# Patient Record
Sex: MALE | Race: WHITE | NOT HISPANIC OR LATINO | Employment: FULL TIME | URBAN - NONMETROPOLITAN AREA
[De-identification: names, ages, dates, MRNs, and addresses within clinical notes are randomized per-mention and may not be internally consistent; named-entity substitution may affect disease eponyms.]

---

## 2023-02-27 LAB
ESTIMATED AVERAGE GLUCOSE FOR HBA1C: 203 MG/DL
HEMOGLOBIN A1C/HEMOGLOBIN TOTAL IN BLOOD: 8.7 %

## 2023-03-31 ENCOUNTER — TELEPHONE (OUTPATIENT)
Dept: PRIMARY CARE | Facility: CLINIC | Age: 68
End: 2023-03-31
Payer: COMMERCIAL

## 2023-03-31 NOTE — TELEPHONE ENCOUNTER
Patient called and says that he needs to go over his sugar prescriptions and is having issues getting some of his medications filled

## 2023-03-31 NOTE — TELEPHONE ENCOUNTER
Pt stated insurance will not cover glyburide but they will cover glimeperide, will not cover jardiance but will cover farxiga-  after starting jardiance  BG was 105, he did a protein diet last week bg 97-78

## 2023-04-03 DIAGNOSIS — E11.9 TYPE 2 DIABETES MELLITUS WITHOUT COMPLICATION, WITHOUT LONG-TERM CURRENT USE OF INSULIN (MULTI): Primary | ICD-10-CM

## 2023-04-03 RX ORDER — GLYBURIDE 5 MG/1
5 TABLET ORAL
COMMUNITY
End: 2023-04-03

## 2023-04-03 RX ORDER — EMPAGLIFLOZIN 10 MG/1
1 TABLET, FILM COATED ORAL DAILY
COMMUNITY
Start: 2023-02-27 | End: 2023-04-03

## 2023-04-03 RX ORDER — PIOGLITAZONEHYDROCHLORIDE 45 MG/1
1 TABLET ORAL DAILY
COMMUNITY
Start: 2023-02-26 | End: 2023-08-28 | Stop reason: SDUPTHER

## 2023-04-03 RX ORDER — GLIMEPIRIDE 2 MG/1
2 TABLET ORAL
Qty: 30 TABLET | Refills: 1 | Status: SHIPPED | OUTPATIENT
Start: 2023-04-03 | End: 2023-05-25 | Stop reason: SDUPTHER

## 2023-04-03 RX ORDER — DAPAGLIFLOZIN 5 MG/1
5 TABLET, FILM COATED ORAL DAILY
Qty: 30 TABLET | Refills: 1 | Status: SHIPPED | OUTPATIENT
Start: 2023-04-03 | End: 2023-05-25 | Stop reason: SDUPTHER

## 2023-04-03 RX ORDER — METFORMIN HYDROCHLORIDE 500 MG/1
1000 TABLET, EXTENDED RELEASE ORAL 2 TIMES DAILY
COMMUNITY
End: 2023-08-28 | Stop reason: SDUPTHER

## 2023-04-03 NOTE — PROGRESS NOTES
Patient called and endorsed blood sugar 70s-90s after starting Jardiance, but insurance will no longer cover Jardiance or glyburide. Will send Rx for glimeperide 2mg and farxiga 5mg daily. Will advised to call me in 1 month and let me know average blood sugar.

## 2023-05-25 ENCOUNTER — TELEPHONE (OUTPATIENT)
Dept: PRIMARY CARE | Facility: CLINIC | Age: 68
End: 2023-05-25
Payer: COMMERCIAL

## 2023-05-25 DIAGNOSIS — E11.9 TYPE 2 DIABETES MELLITUS WITHOUT COMPLICATION, WITHOUT LONG-TERM CURRENT USE OF INSULIN (MULTI): ICD-10-CM

## 2023-05-25 RX ORDER — DAPAGLIFLOZIN 10 MG/1
10 TABLET, FILM COATED ORAL DAILY
Qty: 90 TABLET | Refills: 1 | Status: SHIPPED | OUTPATIENT
Start: 2023-05-25 | End: 2023-08-28 | Stop reason: SDUPTHER

## 2023-05-25 RX ORDER — GLIMEPIRIDE 2 MG/1
2 TABLET ORAL
Qty: 90 TABLET | Refills: 1 | Status: SHIPPED | OUTPATIENT
Start: 2023-05-25 | End: 2023-08-28 | Stop reason: SDUPTHER

## 2023-05-25 NOTE — TELEPHONE ENCOUNTER
Pt requested refills on two of his DM medications- refills proposed- he voiced understanding to message

## 2023-05-25 NOTE — TELEPHONE ENCOUNTER
Stating last month his blood sugars were pretty good between 130-170, beginning of this month it was 160-260 now this week it has been around 160-180. Can call back with questions.

## 2023-07-01 ENCOUNTER — LAB (OUTPATIENT)
Dept: LAB | Facility: LAB | Age: 68
End: 2023-07-01
Payer: COMMERCIAL

## 2023-07-01 DIAGNOSIS — E11.9 TYPE 2 DIABETES MELLITUS WITHOUT COMPLICATION, WITHOUT LONG-TERM CURRENT USE OF INSULIN (MULTI): ICD-10-CM

## 2023-07-01 LAB
ESTIMATED AVERAGE GLUCOSE FOR HBA1C: 203 MG/DL
HEMOGLOBIN A1C/HEMOGLOBIN TOTAL IN BLOOD: 8.7 %

## 2023-07-01 PROCEDURE — 36415 COLL VENOUS BLD VENIPUNCTURE: CPT

## 2023-07-01 PROCEDURE — 83036 HEMOGLOBIN GLYCOSYLATED A1C: CPT

## 2023-07-03 ENCOUNTER — TELEPHONE (OUTPATIENT)
Dept: PRIMARY CARE | Facility: CLINIC | Age: 68
End: 2023-07-03
Payer: COMMERCIAL

## 2023-07-03 NOTE — TELEPHONE ENCOUNTER
----- Message from Antonio Reece PA-C sent at 7/3/2023  7:39 AM EDT -----  Please let him know his A1C is still at 8.7, same as last check. We will continue current medications for now and see if there is anything else we can adjust at follow up. Thanks!

## 2023-08-25 RX ORDER — LOSARTAN POTASSIUM 50 MG/1
1 TABLET ORAL DAILY
COMMUNITY
Start: 2020-01-13 | End: 2023-08-28 | Stop reason: SDUPTHER

## 2023-08-25 RX ORDER — ATENOLOL 50 MG/1
50 TABLET ORAL DAILY
COMMUNITY
End: 2023-08-28 | Stop reason: SDUPTHER

## 2023-08-25 RX ORDER — SIMVASTATIN 40 MG/1
1 TABLET, FILM COATED ORAL NIGHTLY
COMMUNITY
Start: 2021-09-20 | End: 2023-08-28 | Stop reason: SDUPTHER

## 2023-08-25 RX ORDER — GLYBURIDE 5 MG/1
10 TABLET ORAL
COMMUNITY
Start: 2021-09-20 | End: 2023-08-28

## 2023-08-25 RX ORDER — TRIAMTERENE AND HYDROCHLOROTHIAZIDE 75; 50 MG/1; MG/1
1 TABLET ORAL DAILY
COMMUNITY
End: 2023-08-28 | Stop reason: SDUPTHER

## 2023-08-28 ENCOUNTER — OFFICE VISIT (OUTPATIENT)
Dept: PRIMARY CARE | Facility: CLINIC | Age: 68
End: 2023-08-28
Payer: COMMERCIAL

## 2023-08-28 ENCOUNTER — APPOINTMENT (OUTPATIENT)
Dept: PRIMARY CARE | Facility: CLINIC | Age: 68
End: 2023-08-28
Payer: COMMERCIAL

## 2023-08-28 VITALS
WEIGHT: 315 LBS | BODY MASS INDEX: 45.1 KG/M2 | DIASTOLIC BLOOD PRESSURE: 78 MMHG | SYSTOLIC BLOOD PRESSURE: 128 MMHG | HEART RATE: 78 BPM | HEIGHT: 70 IN

## 2023-08-28 DIAGNOSIS — E11.9 TYPE 2 DIABETES MELLITUS WITHOUT COMPLICATION, WITHOUT LONG-TERM CURRENT USE OF INSULIN (MULTI): ICD-10-CM

## 2023-08-28 DIAGNOSIS — Z12.5 SCREENING FOR PROSTATE CANCER: ICD-10-CM

## 2023-08-28 DIAGNOSIS — I10 HYPERTENSION, UNSPECIFIED TYPE: ICD-10-CM

## 2023-08-28 DIAGNOSIS — E78.2 MIXED DYSLIPIDEMIA: ICD-10-CM

## 2023-08-28 DIAGNOSIS — Z00.00 ROUTINE GENERAL MEDICAL EXAMINATION AT HEALTH CARE FACILITY: Primary | ICD-10-CM

## 2023-08-28 PROCEDURE — 3052F HG A1C>EQUAL 8.0%<EQUAL 9.0%: CPT

## 2023-08-28 PROCEDURE — 4010F ACE/ARB THERAPY RXD/TAKEN: CPT

## 2023-08-28 PROCEDURE — 1036F TOBACCO NON-USER: CPT

## 2023-08-28 PROCEDURE — G0439 PPPS, SUBSEQ VISIT: HCPCS

## 2023-08-28 PROCEDURE — 3074F SYST BP LT 130 MM HG: CPT

## 2023-08-28 PROCEDURE — 1170F FXNL STATUS ASSESSED: CPT

## 2023-08-28 PROCEDURE — 3078F DIAST BP <80 MM HG: CPT

## 2023-08-28 PROCEDURE — 99214 OFFICE O/P EST MOD 30 MIN: CPT

## 2023-08-28 PROCEDURE — 1159F MED LIST DOCD IN RCRD: CPT

## 2023-08-28 RX ORDER — SIMVASTATIN 40 MG/1
40 TABLET, FILM COATED ORAL NIGHTLY
Qty: 90 TABLET | Refills: 3 | Status: SHIPPED | OUTPATIENT
Start: 2023-08-28 | End: 2024-06-03 | Stop reason: SDUPTHER

## 2023-08-28 RX ORDER — DAPAGLIFLOZIN 10 MG/1
10 TABLET, FILM COATED ORAL DAILY
Qty: 90 TABLET | Refills: 3 | Status: SHIPPED | OUTPATIENT
Start: 2023-08-28 | End: 2024-06-03 | Stop reason: SDUPTHER

## 2023-08-28 RX ORDER — TRIAMTERENE AND HYDROCHLOROTHIAZIDE 75; 50 MG/1; MG/1
1 TABLET ORAL DAILY
Qty: 90 TABLET | Refills: 3 | Status: SHIPPED | OUTPATIENT
Start: 2023-08-28 | End: 2024-06-03 | Stop reason: SDUPTHER

## 2023-08-28 RX ORDER — GLIMEPIRIDE 2 MG/1
2 TABLET ORAL
Qty: 90 TABLET | Refills: 3 | Status: SHIPPED | OUTPATIENT
Start: 2023-08-28 | End: 2023-12-04 | Stop reason: SDUPTHER

## 2023-08-28 RX ORDER — ATENOLOL 50 MG/1
50 TABLET ORAL DAILY
Qty: 90 TABLET | Refills: 3 | Status: SHIPPED | OUTPATIENT
Start: 2023-08-28 | End: 2024-06-03 | Stop reason: SDUPTHER

## 2023-08-28 RX ORDER — PIOGLITAZONEHYDROCHLORIDE 45 MG/1
45 TABLET ORAL DAILY
Qty: 90 TABLET | Refills: 3 | Status: SHIPPED | OUTPATIENT
Start: 2023-08-28 | End: 2024-06-03 | Stop reason: SDUPTHER

## 2023-08-28 RX ORDER — METFORMIN HYDROCHLORIDE 500 MG/1
1000 TABLET, EXTENDED RELEASE ORAL 2 TIMES DAILY
Qty: 360 TABLET | Refills: 3 | Status: SHIPPED | OUTPATIENT
Start: 2023-08-28 | End: 2024-06-03 | Stop reason: SDUPTHER

## 2023-08-28 RX ORDER — LOSARTAN POTASSIUM 50 MG/1
50 TABLET ORAL DAILY
Qty: 90 TABLET | Refills: 3 | Status: SHIPPED | OUTPATIENT
Start: 2023-08-28 | End: 2024-06-03 | Stop reason: SDUPTHER

## 2023-08-28 ASSESSMENT — ACTIVITIES OF DAILY LIVING (ADL)
DOING_HOUSEWORK: INDEPENDENT
MANAGING_FINANCES: INDEPENDENT
TAKING_MEDICATION: INDEPENDENT
BATHING: INDEPENDENT
DRESSING: INDEPENDENT
GROCERY_SHOPPING: INDEPENDENT

## 2023-08-28 ASSESSMENT — ENCOUNTER SYMPTOMS
CARDIOVASCULAR NEGATIVE: 1
CONSTITUTIONAL NEGATIVE: 1
GASTROINTESTINAL NEGATIVE: 1
RESPIRATORY NEGATIVE: 1

## 2023-08-28 ASSESSMENT — PATIENT HEALTH QUESTIONNAIRE - PHQ9
1. LITTLE INTEREST OR PLEASURE IN DOING THINGS: NOT AT ALL
SUM OF ALL RESPONSES TO PHQ9 QUESTIONS 1 AND 2: 0
2. FEELING DOWN, DEPRESSED OR HOPELESS: NOT AT ALL

## 2023-08-28 NOTE — PROGRESS NOTES
"Subjective   Reason for Visit: Rangel Tavarez is an 68 y.o. male here for a Medicare Wellness visit.     Past Medical, Surgical, and Family History reviewed and updated in chart.    Reviewed all medications by prescribing practitioner or clinical pharmacist (such as prescriptions, OTCs, herbal therapies and supplements) and documented in the medical record.    HPI  DM2: A1C 8.7 7/1/23. Blood sugar at home averaging 130s-160s. Eye exams yearly in [place]. Daily foot checks, denies neuropathy. Compliant with medications, no SE. He will have occasional jitters when sugar gets down into 70s, he keeps snacks close by in case this happens. He forgets to eat sometimes.  HTN: BP in office good 128/78. Does not check at home. \"Always good\". Denies CP/HA/dizziness/visual changes. Compliant w/medications, no SE.  HYPERLIPIDEMIA: Last lipids good with slightly elevated triglycerides. Compliant with statin, no SE.     PSA WNL 11/22/22.  Denies family Hx of colon cancer, denies colon cancer screening at this time.   Denies age appropriate vaccines at this time.     Drives truck, hauls sand.     Feeling well, no concerns.     Patient Care Team:  Antonio Reece PA-C as PCP - General  Robert Knapp MD as PCP - [facility] PCP     Review of Systems   Constitutional: Negative.    Respiratory: Negative.     Cardiovascular: Negative.    Gastrointestinal: Negative.        Objective   Vitals:  /78   Pulse 78   Ht 1.778 m (5' 10\")   Wt 147 kg (325 lb)   BMI 46.63 kg/m²       Physical Exam  Constitutional:       General: He is not in acute distress.     Appearance: Normal appearance. He is not ill-appearing or toxic-appearing.   HENT:      Head: Normocephalic and atraumatic.   Eyes:      Extraocular Movements: Extraocular movements intact.      Conjunctiva/sclera: Conjunctivae normal.   Cardiovascular:      Rate and Rhythm: Normal rate and regular rhythm.      Heart sounds: Normal heart sounds. No murmur " heard.     No gallop.   Pulmonary:      Effort: Pulmonary effort is normal.      Breath sounds: Normal breath sounds. No stridor. No wheezing.   Abdominal:      General: Bowel sounds are normal. There is no distension.      Palpations: Abdomen is soft.      Tenderness: There is no abdominal tenderness. There is no right CVA tenderness, left CVA tenderness, guarding or rebound.   Musculoskeletal:         General: Normal range of motion.      Cervical back: Neck supple.   Skin:     General: Skin is warm and dry.      Findings: No erythema or rash.   Neurological:      General: No focal deficit present.      Mental Status: He is alert and oriented to person, place, and time.   Psychiatric:         Mood and Affect: Mood normal.         Behavior: Behavior normal.         Thought Content: Thought content normal.         Judgment: Judgment normal.         Assessment/Plan   Problem List Items Addressed This Visit    None  Visit Diagnoses       Type 2 diabetes mellitus without complication, without long-term current use of insulin (CMS/[facility])        Hypertension, unspecified type        Mixed dyslipidemia                 No medication changes today.  Discussed avoiding low blood sugar by eating on a consistent schedule.  We will follow up in 3 months with fasting labs prior.

## 2023-08-28 NOTE — PROGRESS NOTES
"Subjective   Patient ID: Rangel Tavarez is a 68 y.o. male who presents for Medicare Annual Wellness Visit Initial (6 month med check ).    HPI   DM2: A1C 8.6 11/22/22. Blood sugar at home averaging mid-low 100s. Eye exams yearly with Dr. Yoo, [place]. Daily foot checks, denies neuropathy. Compliant with medications, no SE. He will have occasional jitters when sugar gets down into 70s, he keeps snacks close by in case this happens. He forgets to eat sometimes.  HTN: BP in office good 124/62. Does not check at home. \"Always good\". Denies CP/HA/dizziness/visual changes. Compliant w/medications, no SE.  HYPERLIPIDEMIA: Last lipids good with slightly elevated triglycerides. Compliant with statin, no SE.     PSA WNL 11/22/22.  Denies family Hx of colon cancer, denies colon cancer screening at this time.   Denies age appropriate vaccines at this time.     Drives truck, hauls sand.     Feeling well, no concerns.     Review of Systems    Objective   /78   Pulse 78   Ht 1.778 m (5' 10\")   Wt 147 kg (325 lb)   BMI 46.63 kg/m²     Physical Exam    Assessment/Plan          "

## 2023-11-25 ENCOUNTER — LAB (OUTPATIENT)
Dept: LAB | Facility: LAB | Age: 68
End: 2023-11-25
Payer: COMMERCIAL

## 2023-11-25 DIAGNOSIS — E78.2 MIXED DYSLIPIDEMIA: ICD-10-CM

## 2023-11-25 DIAGNOSIS — Z12.5 SCREENING FOR PROSTATE CANCER: ICD-10-CM

## 2023-11-25 DIAGNOSIS — E11.9 TYPE 2 DIABETES MELLITUS WITHOUT COMPLICATION, WITHOUT LONG-TERM CURRENT USE OF INSULIN (MULTI): ICD-10-CM

## 2023-11-25 LAB
ALBUMIN SERPL BCP-MCNC: 4.1 G/DL (ref 3.4–5)
ALP SERPL-CCNC: 54 U/L (ref 33–136)
ALT SERPL W P-5'-P-CCNC: 17 U/L (ref 10–52)
ANION GAP SERPL CALC-SCNC: 14 MMOL/L (ref 10–20)
AST SERPL W P-5'-P-CCNC: 15 U/L (ref 9–39)
BILIRUB SERPL-MCNC: 0.4 MG/DL (ref 0–1.2)
BUN SERPL-MCNC: 26 MG/DL (ref 6–23)
CALCIUM SERPL-MCNC: 9.2 MG/DL (ref 8.6–10.3)
CHLORIDE SERPL-SCNC: 102 MMOL/L (ref 98–107)
CHOLEST SERPL-MCNC: 192 MG/DL (ref 0–199)
CHOLESTEROL/HDL RATIO: 3.8
CO2 SERPL-SCNC: 24 MMOL/L (ref 21–32)
CREAT SERPL-MCNC: 1.22 MG/DL (ref 0.5–1.3)
CREAT UR-MCNC: <1 MG/DL (ref 20–370)
ERYTHROCYTE [DISTWIDTH] IN BLOOD BY AUTOMATED COUNT: 14.9 % (ref 11.5–14.5)
EST. AVERAGE GLUCOSE BLD GHB EST-MCNC: 206 MG/DL
GFR SERPL CREATININE-BSD FRML MDRD: 65 ML/MIN/1.73M*2
GLUCOSE SERPL-MCNC: 171 MG/DL (ref 74–99)
HBA1C MFR BLD: 8.8 %
HCT VFR BLD AUTO: 52.9 % (ref 41–52)
HDLC SERPL-MCNC: 50 MG/DL
HGB BLD-MCNC: 16.6 G/DL (ref 13.5–17.5)
LDLC SERPL CALC-MCNC: 115 MG/DL
MCH RBC QN AUTO: 27.9 PG (ref 26–34)
MCHC RBC AUTO-ENTMCNC: 31.4 G/DL (ref 32–36)
MCV RBC AUTO: 89 FL (ref 80–100)
MICROALBUMIN UR-MCNC: <7 MG/L
MICROALBUMIN/CREAT UR: ABNORMAL MG/G{CREAT}
NON HDL CHOLESTEROL: 142 MG/DL (ref 0–149)
NRBC BLD-RTO: 0 /100 WBCS (ref 0–0)
PLATELET # BLD AUTO: 166 X10*3/UL (ref 150–450)
POTASSIUM SERPL-SCNC: 4.1 MMOL/L (ref 3.5–5.3)
PROT SERPL-MCNC: 6.4 G/DL (ref 6.4–8.2)
PSA SERPL-MCNC: 1.8 NG/ML
RBC # BLD AUTO: 5.94 X10*6/UL (ref 4.5–5.9)
SODIUM SERPL-SCNC: 136 MMOL/L (ref 136–145)
TRIGL SERPL-MCNC: 133 MG/DL (ref 0–149)
VLDL: 27 MG/DL (ref 0–40)
WBC # BLD AUTO: 7.1 X10*3/UL (ref 4.4–11.3)

## 2023-11-25 PROCEDURE — 36415 COLL VENOUS BLD VENIPUNCTURE: CPT

## 2023-11-25 PROCEDURE — G0103 PSA SCREENING: HCPCS

## 2023-11-25 PROCEDURE — 83036 HEMOGLOBIN GLYCOSYLATED A1C: CPT

## 2023-11-25 PROCEDURE — 80053 COMPREHEN METABOLIC PANEL: CPT

## 2023-11-25 PROCEDURE — 80061 LIPID PANEL: CPT

## 2023-11-25 PROCEDURE — 85027 COMPLETE CBC AUTOMATED: CPT

## 2023-11-25 PROCEDURE — 82043 UR ALBUMIN QUANTITATIVE: CPT

## 2023-11-25 PROCEDURE — 82570 ASSAY OF URINE CREATININE: CPT

## 2023-12-04 ENCOUNTER — OFFICE VISIT (OUTPATIENT)
Dept: PRIMARY CARE | Facility: CLINIC | Age: 68
End: 2023-12-04
Payer: COMMERCIAL

## 2023-12-04 VITALS
BODY MASS INDEX: 44.1 KG/M2 | SYSTOLIC BLOOD PRESSURE: 124 MMHG | HEART RATE: 76 BPM | WEIGHT: 315 LBS | DIASTOLIC BLOOD PRESSURE: 62 MMHG | HEIGHT: 71 IN

## 2023-12-04 DIAGNOSIS — E11.9 TYPE 2 DIABETES MELLITUS WITHOUT COMPLICATION, WITHOUT LONG-TERM CURRENT USE OF INSULIN (MULTI): ICD-10-CM

## 2023-12-04 DIAGNOSIS — E78.2 MIXED DYSLIPIDEMIA: ICD-10-CM

## 2023-12-04 DIAGNOSIS — I10 HYPERTENSION, UNSPECIFIED TYPE: Primary | ICD-10-CM

## 2023-12-04 PROCEDURE — 3074F SYST BP LT 130 MM HG: CPT

## 2023-12-04 PROCEDURE — 3078F DIAST BP <80 MM HG: CPT

## 2023-12-04 PROCEDURE — 1159F MED LIST DOCD IN RCRD: CPT

## 2023-12-04 PROCEDURE — 3052F HG A1C>EQUAL 8.0%<EQUAL 9.0%: CPT

## 2023-12-04 PROCEDURE — 3049F LDL-C 100-129 MG/DL: CPT

## 2023-12-04 PROCEDURE — 4010F ACE/ARB THERAPY RXD/TAKEN: CPT

## 2023-12-04 PROCEDURE — 99213 OFFICE O/P EST LOW 20 MIN: CPT

## 2023-12-04 PROCEDURE — 3062F POS MACROALBUMINURIA REV: CPT

## 2023-12-04 PROCEDURE — 1036F TOBACCO NON-USER: CPT

## 2023-12-04 RX ORDER — GLIMEPIRIDE 4 MG/1
4 TABLET ORAL
Qty: 90 TABLET | Refills: 3 | Status: SHIPPED | OUTPATIENT
Start: 2023-12-04 | End: 2024-12-03

## 2023-12-04 RX ORDER — ASPIRIN/CAFFEINE 500-32.5MG
TABLET ORAL
COMMUNITY

## 2023-12-04 ASSESSMENT — ENCOUNTER SYMPTOMS
CONSTITUTIONAL NEGATIVE: 1
CARDIOVASCULAR NEGATIVE: 1
RESPIRATORY NEGATIVE: 1
GASTROINTESTINAL NEGATIVE: 1

## 2023-12-04 NOTE — PROGRESS NOTES
"Subjective   Patient ID: Rangel Tavarez is a 68 y.o. male who presents for 3 month f/u review labs (Pt c/o excessive thirst and hunger x2 months).    HPI   DM2: A1C 8.7 7/1/23. Blood sugar at home ranging averaging between 120-190s, he did have one low in the 60s. Eye exams yearly in [place]. Daily foot checks, denies neuropathy. Compliant with medications, no SE. He will have occasional jitters when sugar gets down into 70s, he keeps snacks close by in case this happens. Trying to eat consistently.  HTN: BP in office good 124/62. Does not check at home. \"Always good\". Denies CP/HA/dizziness/visual changes. Compliant w/medications, no SE.  HYPERLIPIDEMIA: Last lipids good with slightly LDL. Compliant with statin, no SE.    He does endorse some increased thirst recently, most likely due to elevated glucose levels.     PSA WNL 11/25/23.  Denies family Hx of colon cancer, denies colon cancer screening at this time.   Denies lung cancer screening at this time.  Denies age appropriate vaccines at this time.     Drives truck, hauls sand.    Review of Systems   Constitutional: Negative.    Respiratory: Negative.     Cardiovascular: Negative.    Gastrointestinal: Negative.        Objective   /62   Pulse 76   Ht 1.791 m (5' 10.5\")   Wt 148 kg (327 lb)   BMI 46.26 kg/m²     Physical Exam  Constitutional:       General: He is not in acute distress.     Appearance: Normal appearance. He is not ill-appearing.   HENT:      Head: Normocephalic and atraumatic.   Eyes:      Extraocular Movements: Extraocular movements intact.      Conjunctiva/sclera: Conjunctivae normal.   Cardiovascular:      Rate and Rhythm: Normal rate.   Pulmonary:      Effort: Pulmonary effort is normal.   Abdominal:      General: There is no distension.   Musculoskeletal:         General: Normal range of motion.      Cervical back: Normal range of motion.   Skin:     General: Skin is warm and dry.   Neurological:      General: No focal deficit " present.      Mental Status: He is alert and oriented to person, place, and time.   Psychiatric:         Mood and Affect: Mood normal.         Behavior: Behavior normal.         Thought Content: Thought content normal.         Judgment: Judgment normal.         Assessment/Plan        Discussed GLP1-agonists, he will think about this.  Increased glimepiride to 4mg daily, no other medication changes today.  Follow up 3 months with A1C prior.

## 2024-03-25 ENCOUNTER — APPOINTMENT (OUTPATIENT)
Dept: PRIMARY CARE | Facility: CLINIC | Age: 69
End: 2024-03-25
Payer: COMMERCIAL

## 2024-03-28 ENCOUNTER — APPOINTMENT (OUTPATIENT)
Dept: PRIMARY CARE | Facility: CLINIC | Age: 69
End: 2024-03-28
Payer: COMMERCIAL

## 2024-05-18 ENCOUNTER — LAB (OUTPATIENT)
Dept: LAB | Facility: LAB | Age: 69
End: 2024-05-18
Payer: COMMERCIAL

## 2024-05-18 DIAGNOSIS — E11.9 TYPE 2 DIABETES MELLITUS WITHOUT COMPLICATION, WITHOUT LONG-TERM CURRENT USE OF INSULIN (MULTI): ICD-10-CM

## 2024-05-18 LAB
EST. AVERAGE GLUCOSE BLD GHB EST-MCNC: 194 MG/DL
HBA1C MFR BLD: 8.4 %

## 2024-05-18 PROCEDURE — 83036 HEMOGLOBIN GLYCOSYLATED A1C: CPT

## 2024-05-18 PROCEDURE — 36415 COLL VENOUS BLD VENIPUNCTURE: CPT

## 2024-06-03 ENCOUNTER — OFFICE VISIT (OUTPATIENT)
Dept: PRIMARY CARE | Facility: CLINIC | Age: 69
End: 2024-06-03
Payer: COMMERCIAL

## 2024-06-03 VITALS
DIASTOLIC BLOOD PRESSURE: 80 MMHG | HEIGHT: 71 IN | BODY MASS INDEX: 44.1 KG/M2 | WEIGHT: 315 LBS | HEART RATE: 91 BPM | OXYGEN SATURATION: 95 % | SYSTOLIC BLOOD PRESSURE: 120 MMHG

## 2024-06-03 DIAGNOSIS — E78.2 MIXED DYSLIPIDEMIA: ICD-10-CM

## 2024-06-03 DIAGNOSIS — E11.9 TYPE 2 DIABETES MELLITUS WITHOUT COMPLICATION, WITHOUT LONG-TERM CURRENT USE OF INSULIN (MULTI): ICD-10-CM

## 2024-06-03 DIAGNOSIS — I10 HYPERTENSION, UNSPECIFIED TYPE: ICD-10-CM

## 2024-06-03 DIAGNOSIS — Z12.5 SCREENING FOR PROSTATE CANCER: ICD-10-CM

## 2024-06-03 PROCEDURE — 3079F DIAST BP 80-89 MM HG: CPT

## 2024-06-03 PROCEDURE — 3074F SYST BP LT 130 MM HG: CPT

## 2024-06-03 PROCEDURE — 4010F ACE/ARB THERAPY RXD/TAKEN: CPT

## 2024-06-03 PROCEDURE — 3052F HG A1C>EQUAL 8.0%<EQUAL 9.0%: CPT

## 2024-06-03 PROCEDURE — 1036F TOBACCO NON-USER: CPT

## 2024-06-03 PROCEDURE — 99214 OFFICE O/P EST MOD 30 MIN: CPT

## 2024-06-03 PROCEDURE — 3008F BODY MASS INDEX DOCD: CPT

## 2024-06-03 PROCEDURE — 1159F MED LIST DOCD IN RCRD: CPT

## 2024-06-03 RX ORDER — PIOGLITAZONEHYDROCHLORIDE 45 MG/1
45 TABLET ORAL DAILY
Qty: 90 TABLET | Refills: 3 | Status: SHIPPED | OUTPATIENT
Start: 2024-06-03 | End: 2025-06-03

## 2024-06-03 RX ORDER — LOSARTAN POTASSIUM 50 MG/1
50 TABLET ORAL DAILY
Qty: 90 TABLET | Refills: 3 | Status: SHIPPED | OUTPATIENT
Start: 2024-06-03 | End: 2025-06-03

## 2024-06-03 RX ORDER — ATENOLOL 50 MG/1
50 TABLET ORAL DAILY
Qty: 90 TABLET | Refills: 3 | Status: SHIPPED | OUTPATIENT
Start: 2024-06-03 | End: 2025-06-03

## 2024-06-03 RX ORDER — TRIAMTERENE AND HYDROCHLOROTHIAZIDE 75; 50 MG/1; MG/1
1 TABLET ORAL DAILY
Qty: 90 TABLET | Refills: 3 | Status: SHIPPED | OUTPATIENT
Start: 2024-06-03 | End: 2025-06-03

## 2024-06-03 RX ORDER — SIMVASTATIN 40 MG/1
40 TABLET, FILM COATED ORAL NIGHTLY
Qty: 90 TABLET | Refills: 3 | Status: SHIPPED | OUTPATIENT
Start: 2024-06-03 | End: 2025-06-03

## 2024-06-03 RX ORDER — METFORMIN HYDROCHLORIDE 500 MG/1
1000 TABLET, EXTENDED RELEASE ORAL 2 TIMES DAILY
Qty: 360 TABLET | Refills: 3 | Status: SHIPPED | OUTPATIENT
Start: 2024-06-03 | End: 2025-06-03

## 2024-06-03 ASSESSMENT — ENCOUNTER SYMPTOMS
GASTROINTESTINAL NEGATIVE: 1
RESPIRATORY NEGATIVE: 1
CONSTITUTIONAL NEGATIVE: 1
CARDIOVASCULAR NEGATIVE: 1

## 2024-06-03 ASSESSMENT — PATIENT HEALTH QUESTIONNAIRE - PHQ9
2. FEELING DOWN, DEPRESSED OR HOPELESS: NOT AT ALL
SUM OF ALL RESPONSES TO PHQ9 QUESTIONS 1 AND 2: 0
1. LITTLE INTEREST OR PLEASURE IN DOING THINGS: NOT AT ALL

## 2024-06-03 NOTE — PROGRESS NOTES
"Subjective   Patient ID: Rangel Tavarez is a 68 y.o. male who presents for pt here for med check .    HPI   DM2: A1C 8.4 last check. Not checking sugar at home. Eye exams yearly in [place]. Daily foot checks, denies neuropathy. Compliant with medications, no SE. No lows.   HTN: BP in office good 120/80. Does not check at home. \"Always good\". Denies CP/HA/dizziness/visual changes. Was tachy at beginning of visit today, rechecked at end of visit back into 90s. Compliant w/medications, no SE.  HYPERLIPIDEMIA: Last lipids good with slightly LDL. Compliant with statin, no SE.     PSA WNL 11/25/23.  Denies family Hx of colon cancer, denies colon cancer screening at this time.   Denies lung cancer screening at this time.  Denies age appropriate vaccines at this time.     Drives truck, hauls sand.    Review of Systems   Constitutional: Negative.    Respiratory: Negative.     Cardiovascular: Negative.    Gastrointestinal: Negative.        Objective   /80   Pulse (!) 112   Ht 1.791 m (5' 10.5\")   Wt 148 kg (327 lb)   SpO2 95%   BMI 46.26 kg/m²     Physical Exam  Constitutional:       General: He is not in acute distress.     Appearance: Normal appearance. He is not ill-appearing.   HENT:      Head: Normocephalic and atraumatic.   Eyes:      Extraocular Movements: Extraocular movements intact.      Conjunctiva/sclera: Conjunctivae normal.   Cardiovascular:      Rate and Rhythm: Normal rate and regular rhythm.      Heart sounds: Normal heart sounds. No murmur heard.  Pulmonary:      Effort: Pulmonary effort is normal.      Breath sounds: Normal breath sounds. No wheezing.   Abdominal:      General: There is no distension.   Musculoskeletal:         General: Normal range of motion.      Cervical back: Normal range of motion.   Skin:     General: Skin is warm and dry.   Neurological:      General: No focal deficit present.      Mental Status: He is alert and oriented to person, place, and time.   Psychiatric:         " Mood and Affect: Mood normal.         Behavior: Behavior normal.         Thought Content: Thought content normal.         Judgment: Judgment normal.         Assessment/Plan        Advised monitor HR for consistent increases and let me know.  Insurance seems to want to pay for Jardiance now, so switched from Farxiga to Jardiance and increased to 25mg daily.  No other medication changes today.  Follow up 6 months with fasting labs prior.

## 2024-06-19 ENCOUNTER — TELEPHONE (OUTPATIENT)
Dept: PRIMARY CARE | Facility: CLINIC | Age: 69
End: 2024-06-19
Payer: COMMERCIAL

## 2024-06-20 DIAGNOSIS — I10 HYPERTENSION, UNSPECIFIED TYPE: ICD-10-CM

## 2024-06-24 ENCOUNTER — TELEPHONE (OUTPATIENT)
Dept: PRIMARY CARE | Facility: CLINIC | Age: 69
End: 2024-06-24
Payer: COMMERCIAL

## 2024-06-24 NOTE — TELEPHONE ENCOUNTER
HE CALLED TO SAY HE SHOULD BE IN THE TRUCK FOR THE REST OF THE DAY, AND TO CALL HIM BACK. THANK YOU.

## 2024-06-25 ENCOUNTER — TELEPHONE (OUTPATIENT)
Dept: PRIMARY CARE | Facility: CLINIC | Age: 69
End: 2024-06-25
Payer: COMMERCIAL

## 2024-06-25 DIAGNOSIS — I10 HYPERTENSION, UNSPECIFIED TYPE: ICD-10-CM

## 2024-06-25 DIAGNOSIS — R00.0 TACHYCARDIA: Primary | ICD-10-CM

## 2024-06-25 NOTE — TELEPHONE ENCOUNTER
Per call from patient, he will be unable to  his monitor at the hospital on Wednesday, he will be back in town on Friday, please call back to discuss

## 2024-06-26 ENCOUNTER — APPOINTMENT (OUTPATIENT)
Dept: CARDIOLOGY | Facility: HOSPITAL | Age: 69
End: 2024-06-26
Payer: COMMERCIAL

## 2024-06-28 ENCOUNTER — TELEPHONE (OUTPATIENT)
Dept: PRIMARY CARE | Facility: CLINIC | Age: 69
End: 2024-06-28
Payer: COMMERCIAL

## 2024-06-28 RX ORDER — ATENOLOL 50 MG/1
100 TABLET ORAL DAILY
Qty: 180 TABLET | Refills: 0 | Status: SHIPPED | OUTPATIENT
Start: 2024-06-28 | End: 2025-06-28

## 2024-06-28 NOTE — TELEPHONE ENCOUNTER
atenolol (Tenormin) 50 mg tablet [[id number]]    Order Details  Dose: 50 mg Route: oral Frequency: Daily   Dispense Quantity: 90 tablet Refills: 3          Sig: Take 1 tablet (50 mg) by mouth once daily.   HE SAID HE HAS BEEN DOUBLE UP ON THIS PILL AND IS ALMOST OUT, HAS 9 PILLS LEFT. . BP HAS BEEN 130/80, 109/72, PULSE LOW TO  ,O2 LOW MID 90S. THANK YOU.

## 2024-06-28 NOTE — TELEPHONE ENCOUNTER
HE CALLED TO SAY HE IS PICKING UP THAT MONITOR NEXT FRIDAY AT 8:00. THANK YOU.   DOES HE NEED A EKG? PLEASE CALL HIM BACK.

## 2024-07-05 ENCOUNTER — HOSPITAL ENCOUNTER (OUTPATIENT)
Dept: CARDIOLOGY | Facility: HOSPITAL | Age: 69
Discharge: HOME | End: 2024-07-05
Payer: COMMERCIAL

## 2024-07-05 DIAGNOSIS — R00.0 TACHYCARDIA: ICD-10-CM

## 2024-07-05 PROCEDURE — 93242 EXT ECG>48HR<7D RECORDING: CPT

## 2024-07-12 ENCOUNTER — TELEPHONE (OUTPATIENT)
Dept: PRIMARY CARE | Facility: CLINIC | Age: 69
End: 2024-07-12
Payer: COMMERCIAL

## 2024-07-12 NOTE — TELEPHONE ENCOUNTER
I spoke to pt he voiced understanding- his symptoms are stable , he was instructed to go to ER if symptoms worsen or change

## 2024-07-18 ENCOUNTER — TELEPHONE (OUTPATIENT)
Dept: PRIMARY CARE | Facility: CLINIC | Age: 69
End: 2024-07-18
Payer: COMMERCIAL

## 2024-07-19 ENCOUNTER — OFFICE VISIT (OUTPATIENT)
Dept: CARDIOLOGY | Facility: CLINIC | Age: 69
End: 2024-07-19
Payer: COMMERCIAL

## 2024-07-19 ENCOUNTER — TELEPHONE (OUTPATIENT)
Dept: CARDIOLOGY | Facility: CLINIC | Age: 69
End: 2024-07-19

## 2024-07-19 ENCOUNTER — LAB (OUTPATIENT)
Dept: LAB | Facility: LAB | Age: 69
End: 2024-07-19
Payer: COMMERCIAL

## 2024-07-19 VITALS
WEIGHT: 315 LBS | HEIGHT: 71 IN | DIASTOLIC BLOOD PRESSURE: 82 MMHG | HEART RATE: 131 BPM | OXYGEN SATURATION: 95 % | BODY MASS INDEX: 44.1 KG/M2 | SYSTOLIC BLOOD PRESSURE: 120 MMHG

## 2024-07-19 DIAGNOSIS — I48.0 PAROXYSMAL ATRIAL FIBRILLATION (MULTI): Primary | ICD-10-CM

## 2024-07-19 DIAGNOSIS — G47.30 SLEEP APNEA TREATED WITH CONTINUOUS POSITIVE AIRWAY PRESSURE (CPAP): ICD-10-CM

## 2024-07-19 DIAGNOSIS — I48.0 PAROXYSMAL ATRIAL FIBRILLATION (MULTI): ICD-10-CM

## 2024-07-19 DIAGNOSIS — I10 PRIMARY HYPERTENSION: ICD-10-CM

## 2024-07-19 DIAGNOSIS — R06.09 DYSPNEA ON EXERTION: ICD-10-CM

## 2024-07-19 LAB — TSH SERPL-ACNC: 1.83 MIU/L (ref 0.44–3.98)

## 2024-07-19 PROCEDURE — 4010F ACE/ARB THERAPY RXD/TAKEN: CPT

## 2024-07-19 PROCEDURE — 93000 ELECTROCARDIOGRAM COMPLETE: CPT

## 2024-07-19 PROCEDURE — 3074F SYST BP LT 130 MM HG: CPT

## 2024-07-19 PROCEDURE — 1159F MED LIST DOCD IN RCRD: CPT

## 2024-07-19 PROCEDURE — 99204 OFFICE O/P NEW MOD 45 MIN: CPT

## 2024-07-19 PROCEDURE — 3008F BODY MASS INDEX DOCD: CPT

## 2024-07-19 PROCEDURE — 84443 ASSAY THYROID STIM HORMONE: CPT

## 2024-07-19 PROCEDURE — 3079F DIAST BP 80-89 MM HG: CPT

## 2024-07-19 PROCEDURE — 36415 COLL VENOUS BLD VENIPUNCTURE: CPT

## 2024-07-19 PROCEDURE — 1036F TOBACCO NON-USER: CPT

## 2024-07-19 PROCEDURE — 3052F HG A1C>EQUAL 8.0%<EQUAL 9.0%: CPT

## 2024-07-19 RX ORDER — AMINOPHYLLINE 25 MG/ML
125 INJECTION, SOLUTION INTRAVENOUS ONCE AS NEEDED
OUTPATIENT
Start: 2024-07-19

## 2024-07-19 RX ORDER — METOPROLOL TARTRATE 50 MG/1
50 TABLET ORAL 2 TIMES DAILY
Qty: 60 TABLET | Refills: 11 | Status: SHIPPED | OUTPATIENT
Start: 2024-07-19 | End: 2025-07-19

## 2024-07-19 RX ORDER — REGADENOSON 0.08 MG/ML
0.4 INJECTION, SOLUTION INTRAVENOUS
OUTPATIENT
Start: 2024-07-19

## 2024-07-19 NOTE — PROGRESS NOTES
CHIEF COMPLAINT  Referred by PCP, Antonio Reece PA-C for Afib on holter monitor    HISTORY OF PRESENT ILLNESS    Patient reported to PCP with complaints of tachycardia to which a 3-day monitor was ordered showing predominant rhythm of afib with 99% of the time HR spent in RVR.     Risk factors:  Pulmonary-none  Ischemia-unknown  Infection-no known recent infections   Rheumatic heart disease-unknown  Alcohol-1-2 drinks per week  Thyroid-last result in 2020 which were normal   Left atrial enlargement-unknown  Sleep apnea-compliant with CPAP  Obesity-BMI is 46    Currently, patient denies any chest pain/pressure/discomfort, worsening shortness of breath, palpitations, or dizziness. He does report bilateral lower leg edema but attributes it to his profession as a .     Risk factors:  Pulmonary-No   Ischemia-unknown  Infection-No   Rheumatic heart disease-unknown  Alcohol-  Thyroid-last result in 2020   Left atrial enlargement-unknown  Sleep apnea-compliant with CPAP; not currently managed by any provider   Obesity-BMI is 46    [id number] Score of 3    Contributory family hx:  Paternal grandfather-heart attack  Father-heart attack     Cardiovascular testing:  3-day monitor (June, 2024)-showing predominant rhythm Afib/flutter with average rate of 130 bpm; 100% burden of afib/flutter with >99% of time in RVR        Past Medical, Surgical, and Family History reviewed and updated in chart.     Reviewed all medications by prescribing practitioner or clinical pharmacist (such as prescriptions, OTCs, herbal therapies and supplements) and documented in the medical record.    Past Medical History  History reviewed. No pertinent past medical history.    Social History  Social History     Tobacco Use    Smoking status: Former     Types: Cigarettes    Smokeless tobacco: Never   Vaping Use    Vaping status: Never Used   Substance Use Topics    Alcohol use: Yes    Drug use: Never       Family History     Family  "History   Problem Relation Name Age of Onset    Heart attack Father      Diabetes Paternal Grandfather      Heart attack Paternal Grandfather      Cancer Other uncle         Allergies:  No Known Allergies     Outpatient Medications:  Current Outpatient Medications   Medication Instructions    apixaban (ELIQUIS) 5 mg, oral, 2 times daily    aspirin-caffeine (Georgi [employer]) 500-32.5 mg tablet oral    empagliflozin (JARDIANCE) 25 mg, oral, Daily    glimepiride (AMARYL) 4 mg, oral, Daily before breakfast    losartan (COZAAR) 50 mg, oral, Daily    metFORMIN XR (GLUCOPHAGE-XR) 1,000 mg, oral, 2 times daily    metoprolol tartrate (LOPRESSOR) 50 mg, oral, 2 times daily    pioglitazone (ACTOS) 45 mg, oral, Daily    simvastatin (ZOCOR) 40 mg, oral, Nightly    triamterene-hydrochlorothiazid (Maxzide) 75-50 mg tablet 1 tablet, oral, Daily          Labs:   CMP:  Recent Labs     11/25/23  0810 11/22/22  0650 07/23/22  1029 02/12/22  1025 04/24/21  0730    136 138 136 138   K 4.1 4.1 4.1 4.0 3.9    102 104 103 107   CO2 24 27 25 27 23   ANIONGAP 14 11 13 10 12   BUN 26* 29* 31* 21 24*   CREATININE 1.22 1.24 1.38* 1.15 1.16   EGFR 65  --   --   --   --      Recent Labs     11/25/23  0810 11/22/22  0650 02/12/22  1025 10/24/20  0756   ALBUMIN 4.1 4.1 4.1 4.2   ALKPHOS 54 55 48 55   ALT 17 19 24 19   AST 15 13 19 13   BILITOT 0.4 0.4 0.5 0.4     CBC:  Recent Labs     11/25/23  0810 10/24/20  0756 01/06/20  0858   WBC 7.1 6.7 7.5   HGB 16.6 16.3 15.8   HCT 52.9* 49.3 46.7    187 162   MCV 89 85 84     COAG: No results for input(s): \"PTT\", \"INR\", \"HAUF\", \"DDIMERVTE\", \"HAPTOGLOBIN\", \"FIBRINOGEN\" in the last 76943 hours.  ABO: No results for input(s): \"ABO\" in the last 56450 hours.  HEME/ENDO:  Recent Labs     05/18/24  0836 11/25/23  0810 07/01/23  0818 02/27/23  0834 01/25/21  1410 10/24/20  0756   TSH  --   --   --   --   --  2.40   HGBA1C 8.4* 8.8* 8.7* 8.7*   < > 8.2    < > = values in this interval not " "displayed.      CARDIAC: No results for input(s): \"LDH\", \"CKMB\", \"TROPHS\", \"BNP\" in the last 56028 hours.    No lab exists for component: \"CK\", \"CKMBP\"  Recent Labs     11/25/23  0810 11/22/22  0650 02/12/22  1025 04/24/21  0730   CHOL 192 175 173 148   LDLF  --  96 111* 90   HDL 50.0 47.0 43.0 35.0*   TRIG 133 161* 96 113     MICRO: No results for input(s): \"ESR\", \"CRP\", \"PROCAL\" in the last 72813 hours.  No results found for the last 90 days.    Notable Studies: imaging personally reviewed   EKG:No results found for this or any previous visit (from the past 4464 hour(s)).  Echocardiogram: No results found for this or any previous visit from the past 1825 days.    Stress Testing: No results found for this or any previous visit from the past 1825 days.    Cardiac Catheterization: No results found for this or any previous visit from the past 1825 days.  No results found for this or any previous visit from the past 3650 days.         REVIEW OF SYSTEMS  A 10-point system review was completed and was negative except as noted in the HPI.      VITALS  Vitals:    07/19/24 1021   BP: 120/82   Pulse: (!) 131   SpO2: 95%       PHYSICAL EXAM  General: awake, alert and oriented. No acute distress.   Skin: Skin is warm, dry and intact without rashes or lesions.  HEENT: normocephalic, atraumatic; conjunctivae are clear without exudates or hemorrhage. Sclera is non-icteric. Eyelids are normal in appearance without swelling or lesions. Hearing intact. Nares are patent bilaterally. Moist mucous membranes.   Cardiovascular: heart rate is fast; rhythm is sinus   Respiratory: bilateral lung sounds clear to auscultations without rales, rhonchi, or wheezes. No accessory muscle use or stridor  Gastrointestinal: obese  Genitourinary: exam deferred  Musculoskeletal: ROM intact, no deformities  Extremities: pulses palpable bilaterally;+1 to BLLE  Neurological: no focal deficits; gait steady  Psychiatric: appropriate mood and affect; good " judgment and insight          ASSESSMENT AND PLAN  Assessment/Plan   Diagnoses and all orders for this visit:  Paroxysmal atrial fibrillation (Multi)  Dyspnea on exertion  Sleep apnea treated with continuous positive airway pressure (CPAP)  -Will order nuclear stress test to rule out ischemia; patient requests Lexiscan due to bad knees. Patient educated if stress test is abnormal, a LHC will be recommended; patient verbalized understanding   -Will order echo to rule out any underlying structural abnormalities   -Will check TSH   -Will stop atenolol and start Lopressor 50mg twice daily  -[id number] Score of 3; will start Eliquis 5mg twice daily; patient educated on common adverse effects of medication and red flag signs to report; Eliquis samples provided.   -Will place referral to clinical pharmacist for cost of DOAC  -Will discuss further rhythm control options at follow up once diagnostic testing reviewed and discussed      RTC: after testing       Thank you for allowing me to participate in the care of this patient. Please reach me out if you have any questions or if you need any clarifications regarding the patient's care.    Neetu Hernandez, DNP, APRN, FNP-C  Division of Cardiovascular Medicine  [facility]  [facility]

## 2024-07-19 NOTE — TELEPHONE ENCOUNTER
----- Message from Vijaya SHAFER sent at 7/19/2024  3:26 PM EDT -----  Pt has been notified.  ----- Message -----  From: KAYLA Sumner-CNP, DNP  Sent: 7/19/2024   3:05 PM EDT  To: Do [email or web address] Card1 Clinical Support Staff    Please let Rangel know his thyroid function test was normal

## 2024-07-25 ENCOUNTER — TELEPHONE (OUTPATIENT)
Dept: PRIMARY CARE | Facility: CLINIC | Age: 69
End: 2024-07-25
Payer: COMMERCIAL

## 2024-07-25 NOTE — TELEPHONE ENCOUNTER
I spoke to pt he does want us to send in yearly rx for the cpap supplies- I contacted [facility] - they sent us a fax to be completed and sent back with face to face notes

## 2024-07-26 ENCOUNTER — TELEPHONE (OUTPATIENT)
Dept: PRIMARY CARE | Facility: CLINIC | Age: 69
End: 2024-07-26
Payer: COMMERCIAL

## 2024-07-26 NOTE — TELEPHONE ENCOUNTER
I spoke to pt regarding his cpap supplies- pt will fax download of compliance to us, we will send in form to [employer] for supplies

## 2024-07-29 ENCOUNTER — APPOINTMENT (OUTPATIENT)
Dept: RADIOLOGY | Facility: HOSPITAL | Age: 69
End: 2024-07-29
Payer: COMMERCIAL

## 2024-07-29 ENCOUNTER — APPOINTMENT (OUTPATIENT)
Dept: CARDIOLOGY | Facility: HOSPITAL | Age: 69
End: 2024-07-29
Payer: COMMERCIAL

## 2024-07-29 DIAGNOSIS — G47.33 OSA (OBSTRUCTIVE SLEEP APNEA): Primary | ICD-10-CM

## 2024-07-30 ENCOUNTER — APPOINTMENT (OUTPATIENT)
Dept: RADIOLOGY | Facility: HOSPITAL | Age: 69
End: 2024-07-30
Payer: COMMERCIAL

## 2024-07-31 ENCOUNTER — TELEPHONE (OUTPATIENT)
Dept: PRIMARY CARE | Facility: CLINIC | Age: 69
End: 2024-07-31
Payer: COMMERCIAL

## 2024-08-01 NOTE — TELEPHONE ENCOUNTER
Pt would like to hold off on the sleep study for now- -he will let us know when it is needed- his cpap machine is working fine - I called Marisol to cancel pt apt and order

## 2024-08-05 ENCOUNTER — HOSPITAL ENCOUNTER (OUTPATIENT)
Dept: RADIOLOGY | Facility: HOSPITAL | Age: 69
Discharge: HOME | End: 2024-08-05
Payer: COMMERCIAL

## 2024-08-05 ENCOUNTER — APPOINTMENT (OUTPATIENT)
Dept: SLEEP MEDICINE | Facility: HOSPITAL | Age: 69
End: 2024-08-05
Payer: COMMERCIAL

## 2024-08-05 ENCOUNTER — HOSPITAL ENCOUNTER (OUTPATIENT)
Dept: CARDIOLOGY | Facility: HOSPITAL | Age: 69
Discharge: HOME | End: 2024-08-05
Payer: COMMERCIAL

## 2024-08-05 DIAGNOSIS — I48.0 PAROXYSMAL ATRIAL FIBRILLATION (MULTI): ICD-10-CM

## 2024-08-05 DIAGNOSIS — R06.09 DYSPNEA ON EXERTION: ICD-10-CM

## 2024-08-05 PROCEDURE — 78452 HT MUSCLE IMAGE SPECT MULT: CPT

## 2024-08-05 PROCEDURE — 93016 CV STRESS TEST SUPVJ ONLY: CPT | Performed by: STUDENT IN AN ORGANIZED HEALTH CARE EDUCATION/TRAINING PROGRAM

## 2024-08-05 PROCEDURE — 2500000004 HC RX 250 GENERAL PHARMACY W/ HCPCS (ALT 636 FOR OP/ED)

## 2024-08-05 PROCEDURE — 93017 CV STRESS TEST TRACING ONLY: CPT

## 2024-08-05 PROCEDURE — 93018 CV STRESS TEST I&R ONLY: CPT | Performed by: STUDENT IN AN ORGANIZED HEALTH CARE EDUCATION/TRAINING PROGRAM

## 2024-08-05 PROCEDURE — 3430000001 HC RX 343 DIAGNOSTIC RADIOPHARMACEUTICALS

## 2024-08-05 PROCEDURE — A9502 TC99M TETROFOSMIN: HCPCS

## 2024-08-05 RX ORDER — REGADENOSON 0.08 MG/ML
0.4 INJECTION, SOLUTION INTRAVENOUS
Status: COMPLETED | OUTPATIENT
Start: 2024-08-05 | End: 2024-08-05

## 2024-08-06 ENCOUNTER — TELEPHONE (OUTPATIENT)
Dept: PRIMARY CARE | Facility: CLINIC | Age: 69
End: 2024-08-06
Payer: COMMERCIAL

## 2024-08-06 ENCOUNTER — TELEPHONE (OUTPATIENT)
Dept: CARDIOLOGY | Facility: CLINIC | Age: 69
End: 2024-08-06
Payer: COMMERCIAL

## 2024-08-06 ENCOUNTER — HOSPITAL ENCOUNTER (OUTPATIENT)
Dept: RADIOLOGY | Facility: HOSPITAL | Age: 69
Discharge: HOME | End: 2024-08-06
Payer: COMMERCIAL

## 2024-08-06 DIAGNOSIS — I10 HYPERTENSION, UNSPECIFIED TYPE: ICD-10-CM

## 2024-08-06 DIAGNOSIS — I48.0 PAROXYSMAL ATRIAL FIBRILLATION (MULTI): ICD-10-CM

## 2024-08-06 DIAGNOSIS — I48.3 TYPICAL ATRIAL FLUTTER (MULTI): Primary | ICD-10-CM

## 2024-08-06 PROCEDURE — 3430000001 HC RX 343 DIAGNOSTIC RADIOPHARMACEUTICALS

## 2024-08-06 PROCEDURE — A9502 TC99M TETROFOSMIN: HCPCS

## 2024-08-06 RX ORDER — METOPROLOL TARTRATE 100 MG/1
100 TABLET ORAL 2 TIMES DAILY
Qty: 60 TABLET | Refills: 11 | Status: SHIPPED | OUTPATIENT
Start: 2024-08-06 | End: 2025-08-06

## 2024-08-06 NOTE — TELEPHONE ENCOUNTER
Antonio are you able to reach out to Neetu David regarding Rangel? They are both very frustrated that test get scheduled then cancelled- their number one concern is that he may have a blood clot and doesn't know it-  they are getting contacted from Yayo Biggs - feeling like no one knows what's going on- Apt is now pushed out to Aug 22 and that one hasn't been confirmed yet. It would be helpful if someone could give them concrete answers and a plan of care and treatment -Thank you!

## 2024-08-06 NOTE — TELEPHONE ENCOUNTER
Patient wife would like a call back as they are getting a lot of run around in regards to his blood thinner with the cardiologist.

## 2024-08-06 NOTE — TELEPHONE ENCOUNTER
"----- Message from Saturnino CHOI sent at 8/6/2024  2:05 PM EDT -----  Pt notified.  ----- Message -----  From: Sarah E Markley, LPN  Sent: 8/6/2024   1:58 PM EDT  To: GEORGE Sumner DNP; #      ----- Message -----  From: GEORGE Sumner DNP  Sent: 8/6/2024   1:50 PM EDT  To: Sarah E Markley, LPN    Have him double up on the metoprolol so 100mg twice daily. I will send in a new prescription  ----- Message -----  From: Sarah E Markley, LPN  Sent: 8/6/2024   1:43 PM EDT  To: GEORGE Sumner DNP; #    Pt called and reports does monitor HR at home and has been approximately 131-135 since beginning of June.  ----- Message -----  From: GEORGE Sumner DNP  Sent: 8/6/2024  11:51 AM EDT  To: Sarah E Markley, LPN    Can patient monitor his resting heart rate in any way? I'm wondering if his symptoms are because his heart rate is high.    His 3 days monitor showed the abnormal heart rhythm with heart rate averaging between 101-145 bpm which is why he was started on different medication to help control his heart rates.  ----- Message -----  From: Sarah E Markley, LPN  Sent: 8/6/2024  10:56 AM EDT  To: Nevaeh Naik CMA; #    Pt called office back and notified of below messages. Pt would like to move forward with ANGELO and cardioversion on 8/22/24 with Dr. Worrell and appreciative. Pt advised to continue blood thinners as directed. Pt started Lopressor 50mg BID and Eliquis 5mg BID on 7/19/24 and reports approximately 1.5 hours after taking becomes extremely hot and lasts for about half hour then subsides. Pt reports mowed lawn yesterday and drank a beer and felt like \"head stopped up and thumps\".  Would you like to make any changes? Pt is also inquiring if there is any further etiology information about elevated heart rate noted on Holter monitor 2 months ago. Pt still needs to reschedule office follow up with Neetu and awaiting procedure confirmation.  ----- Message -----  From: Neetu Hernandez, " APRN-CNP, DNP  Sent: 8/6/2024  10:07 AM EDT  To: Sarah E Markley, LPN    Please let Rangel know the nuclear portion and EKG portion of his stress test show no evidence of ischemia; just the irregular rhythm on the EKG strip.

## 2024-08-06 NOTE — TELEPHONE ENCOUNTER
"----- Message from Nurse May REID sent at 8/6/2024  1:40 PM EDT -----  This nurse called pt and notified of below message. Pt verified does monitor HR at home and has been approximately 131-135 since beginning of June.  ----- Message -----  From: GEORGE Sumner DNP  Sent: 8/6/2024  11:51 AM EDT  To: Sarah E Markley, LPN    Can patient monitor his resting heart rate in any way? I'm wondering if his symptoms are because his heart rate is high.    His 3 days monitor showed the abnormal heart rhythm with heart rate averaging between 101-145 bpm which is why he was started on different medication to help control his heart rates.  ----- Message -----  From: Sarah E Markley, LPN  Sent: 8/6/2024  10:56 AM EDT  To: Nevaeh Naik CMA; #    Pt called office back and notified of below messages. Pt would like to move forward with ANGELO and cardioversion on 8/22/24 with Dr. Worrell and appreciative. Pt advised to continue blood thinners as directed. Pt started Lopressor 50mg BID and Eliquis 5mg BID on 7/19/24 and reports approximately 1.5 hours after taking becomes extremely hot and lasts for about half hour then subsides. Pt reports mowed lawn yesterday and drank a beer and felt like \"head stopped up and thumps\".  Would you like to make any changes? Pt is also inquiring if there is any further etiology information about elevated heart rate noted on Holter monitor 2 months ago. Pt still needs to reschedule office follow up with Neetu and awaiting procedure confirmation.  ----- Message -----  From: GEORGE Sumner DNP  Sent: 8/6/2024  10:07 AM EDT  To: Sarah E Markley, LPN    Please let Rangel know the nuclear portion and EKG portion of his stress test show no evidence of ischemia; just the irregular rhythm on the EKG strip.  "

## 2024-08-07 ENCOUNTER — TELEPHONE (OUTPATIENT)
Dept: PRIMARY CARE | Facility: CLINIC | Age: 69
End: 2024-08-07
Payer: COMMERCIAL

## 2024-08-07 ENCOUNTER — TELEPHONE (OUTPATIENT)
Dept: CARDIOLOGY | Facility: CLINIC | Age: 69
End: 2024-08-07
Payer: COMMERCIAL

## 2024-08-07 NOTE — TELEPHONE ENCOUNTER
"Pt notified  ----- Message from Nevaeh LEE sent at 8/6/2024  1:11 PM EDT -----  LMTCB  pt scheduled for ANGELO/DCCV on 8/22 with Dr. Worrell @ [facility]. OR will call pt with time.  Get labs couple days before procedure.  Take all medications especially Eliquis (No lapse in therapy), however can hold diabetic mes am of since NPO after midnight.    Echo has been cancelled.  FU apt r/s to 9/6 @ 1130am with Neetu.  ----- Message -----  From: Sarah E Markley, LPN  Sent: 8/6/2024   9:29 AM EDT  To: Nevaeh Naik CMA      ----- Message -----  From: Sarah E Markley, LPN  Sent: 8/6/2024   9:23 AM EDT  To: Do Cass Snell Clinical Support Staff    Called pt to update on another option for cardioversion per Neetu Hernandez could possibly be a \"ANGELO and cardioversion on 8/22 with Dr. Worrell. This way, patient only has to come in on one day rather than multiple days. If he is agreeable to this, then we will cancel the echo scheduled for 8/12.\" No answer received and LMTCB.  ----- Message -----  From: Sarah E Markley, LPN  Sent: 8/5/2024   3:34 PM EDT  To: GEORGE Sumner DNP; #    Pt notified and is agreeable to move forward with cardioversion.  ----- Message -----  From: GEORGE Sumenr DNP  Sent: 8/5/2024   3:11 PM EDT  To: Do Cass Snell Clinical Support Staff    Patient was in atrial flutter during stress test which is different than atrial fibrillation, but still an abnormal heart rhythm. I recommend cardioversion to shock the heart back into regular rhythm. If agreeable, he will need a CT watchman to rule out a blood clot in one of the chambers of his heart and if the CT scan is normal we will plan the cardioversion within 48 hours from the scan. The procedure is an outpatient procedure.     I am still awaiting stress test results.  "

## 2024-08-08 ENCOUNTER — TELEPHONE (OUTPATIENT)
Dept: CARDIOLOGY | Facility: CLINIC | Age: 69
End: 2024-08-08
Payer: COMMERCIAL

## 2024-08-08 DIAGNOSIS — E78.2 MIXED DYSLIPIDEMIA: Primary | ICD-10-CM

## 2024-08-08 DIAGNOSIS — I48.0 PAROXYSMAL ATRIAL FIBRILLATION (MULTI): ICD-10-CM

## 2024-08-08 RX ORDER — DILTIAZEM HYDROCHLORIDE 120 MG/1
120 CAPSULE, COATED, EXTENDED RELEASE ORAL DAILY
Qty: 30 CAPSULE | Refills: 11 | Status: SHIPPED | OUTPATIENT
Start: 2024-08-08 | End: 2025-08-08

## 2024-08-08 RX ORDER — ATORVASTATIN CALCIUM 40 MG/1
40 TABLET, FILM COATED ORAL DAILY
Qty: 90 TABLET | Refills: 3 | Status: SHIPPED | OUTPATIENT
Start: 2024-08-08 | End: 2025-08-08

## 2024-08-08 NOTE — TELEPHONE ENCOUNTER
I spoke to pt  and let him know I called him prior to speaking to his wife the other day - I had no new information for him at this time he voiced understanding

## 2024-08-08 NOTE — TELEPHONE ENCOUNTER
"Secure message from Neetu  \"I want to start a medication called diltiazem ER 120mg daily to help better control his heart rate and he needs to stop simvastatin due to drug interaction between diltiazem and simvastatin  So I'm changing to atorvastatin 40\"    Pt notified and verbalized understanding.  "

## 2024-08-12 ENCOUNTER — APPOINTMENT (OUTPATIENT)
Dept: CARDIOLOGY | Facility: HOSPITAL | Age: 69
End: 2024-08-12
Payer: COMMERCIAL

## 2024-08-12 ENCOUNTER — TELEPHONE (OUTPATIENT)
Dept: CARDIOLOGY | Facility: CLINIC | Age: 69
End: 2024-08-12
Payer: COMMERCIAL

## 2024-08-12 NOTE — TELEPHONE ENCOUNTER
----- Message from Neetu Hernandez sent at 8/12/2024 10:12 AM EDT -----  Ok, thanks  ----- Message -----  From: Vijaya Ivy CMA  Sent: 8/12/2024  10:11 AM EDT  To: GEORGE Sumner DNP    Spoke to the pt and he states he just started the Rx yesterday so hasn't noticed any changes yet.  ----- Message -----  From: GEORGE Sumner DNP  Sent: 8/12/2024   8:00 AM EDT  To: Do [email or web address] [employer] Clinical Support Staff    Please call patient and see how his heart rate has been running since starting diltiazem

## 2024-08-17 ENCOUNTER — LAB (OUTPATIENT)
Dept: LAB | Facility: LAB | Age: 69
End: 2024-08-17
Payer: COMMERCIAL

## 2024-08-17 DIAGNOSIS — I10 HYPERTENSION, UNSPECIFIED TYPE: ICD-10-CM

## 2024-08-17 LAB
ANION GAP SERPL CALC-SCNC: 14 MMOL/L (ref 10–20)
BUN SERPL-MCNC: 32 MG/DL (ref 6–23)
CALCIUM SERPL-MCNC: 9.2 MG/DL (ref 8.6–10.3)
CHLORIDE SERPL-SCNC: 106 MMOL/L (ref 98–107)
CO2 SERPL-SCNC: 24 MMOL/L (ref 21–32)
CREAT SERPL-MCNC: 1.48 MG/DL (ref 0.5–1.3)
EGFRCR SERPLBLD CKD-EPI 2021: 51 ML/MIN/1.73M*2
GLUCOSE SERPL-MCNC: 129 MG/DL (ref 74–99)
POTASSIUM SERPL-SCNC: 4.1 MMOL/L (ref 3.5–5.3)
SODIUM SERPL-SCNC: 140 MMOL/L (ref 136–145)

## 2024-08-17 PROCEDURE — 80048 BASIC METABOLIC PNL TOTAL CA: CPT

## 2024-08-17 PROCEDURE — 36415 COLL VENOUS BLD VENIPUNCTURE: CPT

## 2024-08-19 ENCOUNTER — APPOINTMENT (OUTPATIENT)
Dept: CARDIOLOGY | Facility: CLINIC | Age: 69
End: 2024-08-19
Payer: COMMERCIAL

## 2024-08-22 ENCOUNTER — ANESTHESIA EVENT (OUTPATIENT)
Dept: OPERATING ROOM | Facility: HOSPITAL | Age: 69
End: 2024-08-22
Payer: COMMERCIAL

## 2024-08-22 ENCOUNTER — HOSPITAL ENCOUNTER (OUTPATIENT)
Dept: CARDIOLOGY | Facility: HOSPITAL | Age: 69
Discharge: HOME | End: 2024-08-22
Payer: COMMERCIAL

## 2024-08-22 ENCOUNTER — HOSPITAL ENCOUNTER (OUTPATIENT)
Dept: OPERATING ROOM | Facility: HOSPITAL | Age: 69
Setting detail: OUTPATIENT SURGERY
Discharge: HOME | End: 2024-08-22
Payer: COMMERCIAL

## 2024-08-22 ENCOUNTER — ANESTHESIA (OUTPATIENT)
Dept: OPERATING ROOM | Facility: HOSPITAL | Age: 69
End: 2024-08-22
Payer: COMMERCIAL

## 2024-08-22 VITALS
SYSTOLIC BLOOD PRESSURE: 119 MMHG | HEART RATE: 90 BPM | TEMPERATURE: 97.2 F | DIASTOLIC BLOOD PRESSURE: 87 MMHG | RESPIRATION RATE: 16 BRPM | OXYGEN SATURATION: 96 %

## 2024-08-22 DIAGNOSIS — I48.3 TYPICAL ATRIAL FLUTTER (MULTI): ICD-10-CM

## 2024-08-22 DIAGNOSIS — I48.91 ATRIAL FIB/FLUTTER, TRANSIENT (MULTI): Primary | ICD-10-CM

## 2024-08-22 DIAGNOSIS — I48.92 ATRIAL FIB/FLUTTER, TRANSIENT (MULTI): Primary | ICD-10-CM

## 2024-08-22 DIAGNOSIS — I48.4 ATYPICAL ATRIAL FLUTTER (MULTI): ICD-10-CM

## 2024-08-22 LAB
ATRIAL RATE: 91 BPM
EJECTION FRACTION: 50 %
P AXIS: 52 DEGREES
P OFFSET: 200 MS
P ONSET: 130 MS
PR INTERVAL: 168 MS
Q ONSET: 214 MS
QRS COUNT: 15 BEATS
QRS DURATION: 104 MS
QT INTERVAL: 384 MS
QTC CALCULATION(BAZETT): 472 MS
QTC FREDERICIA: 441 MS
R AXIS: -13 DEGREES
T AXIS: 16 DEGREES
T OFFSET: 406 MS
VENTRICULAR RATE: 91 BPM

## 2024-08-22 PROCEDURE — 93005 ELECTROCARDIOGRAM TRACING: CPT

## 2024-08-22 PROCEDURE — 2500000005 HC RX 250 GENERAL PHARMACY W/O HCPCS: Performed by: ANESTHESIOLOGY

## 2024-08-22 PROCEDURE — 3700000001 HC GENERAL ANESTHESIA TIME - INITIAL BASE CHARGE: Performed by: ANESTHESIOLOGY

## 2024-08-22 PROCEDURE — 93320 DOPPLER ECHO COMPLETE: CPT

## 2024-08-22 PROCEDURE — 2500000001 HC RX 250 WO HCPCS SELF ADMINISTERED DRUGS (ALT 637 FOR MEDICARE OP): Performed by: STUDENT IN AN ORGANIZED HEALTH CARE EDUCATION/TRAINING PROGRAM

## 2024-08-22 PROCEDURE — 7100000010 HC PHASE TWO TIME - EACH INCREMENTAL 1 MINUTE: Performed by: ANESTHESIOLOGY

## 2024-08-22 PROCEDURE — 3700000002 HC GENERAL ANESTHESIA TIME - EACH INCREMENTAL 1 MINUTE: Performed by: ANESTHESIOLOGY

## 2024-08-22 PROCEDURE — 2500000004 HC RX 250 GENERAL PHARMACY W/ HCPCS (ALT 636 FOR OP/ED): Performed by: ANESTHESIOLOGY

## 2024-08-22 PROCEDURE — 7100000009 HC PHASE TWO TIME - INITIAL BASE CHARGE: Performed by: ANESTHESIOLOGY

## 2024-08-22 RX ORDER — ONDANSETRON HYDROCHLORIDE 2 MG/ML
4 INJECTION, SOLUTION INTRAVENOUS ONCE
Status: COMPLETED | OUTPATIENT
Start: 2024-08-22 | End: 2024-08-22

## 2024-08-22 RX ORDER — LIDOCAINE HYDROCHLORIDE 20 MG/ML
SOLUTION OROPHARYNGEAL AS NEEDED
Status: COMPLETED | OUTPATIENT
Start: 2024-08-22 | End: 2024-08-22

## 2024-08-22 RX ORDER — SODIUM CHLORIDE, SODIUM LACTATE, POTASSIUM CHLORIDE, CALCIUM CHLORIDE 600; 310; 30; 20 MG/100ML; MG/100ML; MG/100ML; MG/100ML
100 INJECTION, SOLUTION INTRAVENOUS CONTINUOUS
Status: DISCONTINUED | OUTPATIENT
Start: 2024-08-22 | End: 2024-08-23 | Stop reason: HOSPADM

## 2024-08-22 RX ORDER — PROPOFOL 10 MG/ML
INJECTION, EMULSION INTRAVENOUS AS NEEDED
Status: DISCONTINUED | OUTPATIENT
Start: 2024-08-22 | End: 2024-08-22

## 2024-08-22 RX ORDER — MIDAZOLAM HYDROCHLORIDE 1 MG/ML
INJECTION INTRAMUSCULAR; INTRAVENOUS AS NEEDED
Status: DISCONTINUED | OUTPATIENT
Start: 2024-08-22 | End: 2024-08-22

## 2024-08-22 RX ORDER — LIDOCAINE HYDROCHLORIDE 10 MG/ML
INJECTION, SOLUTION EPIDURAL; INFILTRATION; INTRACAUDAL; PERINEURAL AS NEEDED
Status: DISCONTINUED | OUTPATIENT
Start: 2024-08-22 | End: 2024-08-22

## 2024-08-22 RX ORDER — MIDAZOLAM HYDROCHLORIDE 1 MG/ML
2 INJECTION INTRAMUSCULAR; INTRAVENOUS ONCE AS NEEDED
Status: CANCELLED | OUTPATIENT
Start: 2024-08-22

## 2024-08-22 ASSESSMENT — PAIN - FUNCTIONAL ASSESSMENT
PAIN_FUNCTIONAL_ASSESSMENT: 0-10

## 2024-08-22 ASSESSMENT — PAIN SCALES - GENERAL
PAINLEVEL_OUTOF10: 0 - NO PAIN
PAIN_LEVEL: 2
PAINLEVEL_OUTOF10: 0 - NO PAIN

## 2024-08-22 ASSESSMENT — COLUMBIA-SUICIDE SEVERITY RATING SCALE - C-SSRS
1. IN THE PAST MONTH, HAVE YOU WISHED YOU WERE DEAD OR WISHED YOU COULD GO TO SLEEP AND NOT WAKE UP?: NO
6. HAVE YOU EVER DONE ANYTHING, STARTED TO DO ANYTHING, OR PREPARED TO DO ANYTHING TO END YOUR LIFE?: NO
2. HAVE YOU ACTUALLY HAD ANY THOUGHTS OF KILLING YOURSELF?: NO

## 2024-08-22 NOTE — H&P
History Of Present Illness  Rangel Tavarez is a 69 y.o. male presenting with Afib here for ANGELO/DCCV  ASA class II, Mallampati class II  No contraindications to ANGELO/DCCV identified.     Past Medical History  Past Medical History:   Diagnosis Date    Atrial fibrillation (Multi)     Hyperlipidemia     Hypertension     Type 2 diabetes mellitus (Multi)        Surgical History  Past Surgical History:   Procedure Laterality Date    OTHER SURGICAL HISTORY  01/09/2020    Tonsillectomy    OTHER SURGICAL HISTORY  01/09/2020    Hernia repair        Social History  He reports that he has quit smoking. His smoking use included cigarettes. He has never used smokeless tobacco. He reports current alcohol use. He reports that he does not use drugs.    Family History  Family History   Problem Relation Name Age of Onset    Heart attack Father      Diabetes Paternal Grandfather      Heart attack Paternal Grandfather      Cancer Other uncle         Allergies  Patient has no known allergies.    Review of Systems     Physical Exam   General: awake, alert and oriented. No acute distress.   Skin: Skin is warm, dry and intact without rashes or lesions. Appropriate color for ethnicity. Nail beds pink with no cyanosis or clubbing  HEENT: normocephalic, atraumatic; conjunctivae are clear without exudates or hemorrhage. Sclera is non-icteric. Eyelids are normal in appearance without swelling or lesions. Hearing intact. Nares are patent bilaterally. Moist mucous membranes.   Cardiovascular: Regular. No murmurs, gallops, or rubs are auscultated. S1 and S2 are heard and are of normal intensity. No JVD, no carotid bruits  Respiratory: Thorax symmetric. CTAB, breath sounds vesicular. No crackles, wheezes or ronchi.   Gastrointestinal: soft, non-distended, BS + x 4  Genitourinary: exam deferred  Musculoskeletal: moves all extremities  Extremities: pulses palpable bilaterally; no swelling or erythema; no edema  Neurological: alert & oriented x 3; no  focal deficits  Psychiatric: appropriate mood and affect    Last Recorded Vitals  There were no vitals taken for this visit.    Relevant Results             Assessment/Plan   Assessment & Plan  Typical atrial flutter (Multi)    Proceed with ANGELO/DCCV      Tanvir Worrell MD

## 2024-08-22 NOTE — Clinical Note
Patient tolerated the procedure well and is comfortable with no complaints of pain. Vital signs stable. Arousable prior to transport. Patient transported to [facility] via stretcher. Handoff completed.

## 2024-08-22 NOTE — ANESTHESIA PREPROCEDURE EVALUATION
Patient: Rangel Tavarez    Procedure Information       Date/Time: 08/22/24 0830    Scheduled providers: Tanvir Worrell MD    Procedure: TRANSESOPHAGEAL ECHO (ANGELO) W/ POSSIBLE CARDIOVERSION    Location: [facility] OR            Relevant Problems   Anesthesia (within normal limits)      Cardiac   (+) Hypertension   (+) Paroxysmal atrial fibrillation (Multi)      Pulmonary   (+) ALEKS (obstructive sleep apnea)      Endocrine   (+) Type 2 diabetes mellitus without complication, without long-term current use of insulin (Multi)       Clinical information reviewed:    Allergies    Med Hx  Surg Hx    Soc Hx        NPO Detail:  NPO/Void Status  Carbohydrate Drink Given Prior to Surgery? : N  Date of Last Liquid: 08/21/24  Time of Last Liquid: 2000  Date of Last Solid: 08/21/24  Time of Last Solid: 2000  Last Intake Type: Clear fluids  Time of Last Void: 0700         Physical Exam    Airway  Mallampati: II  TM distance: >3 FB  Neck ROM: full     Cardiovascular   Rhythm: regular  Rate: normal     Dental   (+) upper dentures     Pulmonary   Breath sounds clear to auscultation     Abdominal            Anesthesia Plan    History of general anesthesia?: yes  History of complications of general anesthesia?: no    ASA 3     MAC

## 2024-08-22 NOTE — ANESTHESIA POSTPROCEDURE EVALUATION
Patient: Rangel Tavarez    Procedure Summary       Date: 08/22/24 Room / Location: [facility] OR    Anesthesia Start: 0814 Anesthesia Stop: 0844    Procedure: TRANSESOPHAGEAL ECHO (ANGELO) W/ POSSIBLE CARDIOVERSION Diagnosis:       Typical atrial flutter (Multi)      (atrial flutter)    Scheduled Providers: Tanvir Worrell MD Responsible Provider: Gaetano Chris MD    Anesthesia Type: MAC ASA Status: 3            Anesthesia Type: MAC    Vitals Value Taken Time   BP  08/22/24 0844   Temp  08/22/24 0844   Pulse 80 08/22/24 0844   Resp 12 08/22/24 0844   SpO2 99 08/22/24 0844       Anesthesia Post Evaluation    Patient location during evaluation: PACU  Patient participation: complete - patient participated  Level of consciousness: awake and alert  Pain score: 2  Pain management: adequate  Airway patency: patent  Cardiovascular status: acceptable  Respiratory status: acceptable  Hydration status: acceptable  Postoperative Nausea and Vomiting: none        There were no known notable events for this encounter.

## 2024-08-22 NOTE — BRIEF OP NOTE
Direct current cardioversion    Procedures  Transesophageal echocardiogram  Direct current cardioversion (36813)    Patient history:  Please refer to the detailed history and physical on the patient's medical chart.     Procedure narrative:  The risks, benefits, and alternatives to the procedure and sedation were explained to the patient, and informed consent was obtained. The patient was in the fasting state. A grounding pad was placed. Self-adhesive anterior-posterior defibrillation pads were applied. A defibrillator was used for monitoring and the defibrillator waveform was set to biphasic. The patient was set up for continuous monitoring of surface 12 lead ECG, capnography, and pulse oximetry. Blood pressure was monitored with automatic cuff measurements. The procedure was performed under IV conscious sedation supplemented with intermittent deep sedation.   Patient reported taking their anticoagulation Apixaban 5mg BID without interruption in the last 4-6 weeks.  Transesophageal echocardiogram was performed and left atrial appendage thrombus was excluded.  When pt had been adequately sedated, they were cardioverted with a 200J biphasic shock.  This restored sinus rhythm which was confirmed by tele and 12-lead ECG.      Summary:  Patient wassuccessfully cardioverted from Sinus rhythm and Typical atrial flutter to Sinus rhythm    Patient Instructions:  - No Driving or making legal decisions for 24 hours  - DO NOT Stop your blood thinner (Apixaban 5mg BID) unless emergency without checking in with your doctor    Follow up:   Tentatively patient will be discharged Today, following bed rest and subsequent ambulation, provided the recovery parameters are appropriate.   Resume AC Apixaban 5mg BID, DO NOT Stop your blood thinner unless emergency without checking in with your doctor.  No driving, alcohol or making legal decisions for 24 hours.  Plan for follow up with patient's cardiologist/electrophysiologist as  scheduled    See complete procedural log and parameters.

## 2024-08-23 ENCOUNTER — TELEPHONE (OUTPATIENT)
Dept: CARDIOLOGY | Facility: CLINIC | Age: 69
End: 2024-08-23
Payer: COMMERCIAL

## 2024-08-23 LAB
ATRIAL RATE: 262 BPM
P AXIS: 256 DEGREES
P OFFSET: 210 MS
P ONSET: 134 MS
Q ONSET: 212 MS
QRS COUNT: 22 BEATS
QRS DURATION: 100 MS
QT INTERVAL: 276 MS
QTC CALCULATION(BAZETT): 407 MS
QTC FREDERICIA: 358 MS
R AXIS: 124 DEGREES
T AXIS: 56 DEGREES
T OFFSET: 350 MS
VENTRICULAR RATE: 131 BPM

## 2024-08-23 NOTE — TELEPHONE ENCOUNTER
YAMILETH CALLED STATING THAT HIS BLOOD PRESSURE WAS LOWER TODAY AFTER HIS CARDIOVERSION YESTERDAY.  IT WAS RUNNING LOW 90'S/LOW50'S AND PULSE WAS LOW 70'S.    LUIS ADVISED TO DECREASE HIS METOPROLOL TO 50 MG TWICE A DAY INSTEAD  MG TWICE A DAY.  PT UNDERSTOOD AND WILL START THAT.    PATIENT ALSO ADVISED TO GO TO ER IF THINGS CHANGE OR WORSEN

## 2024-09-04 NOTE — PROGRESS NOTES
CHIEF COMPLAINT  Follow up post DCCV    HISTORY OF PRESENT ILLNESS    Cardiovascular hx:    Atrial flutter:  -Patient underwent NAGELO/DCCV with successful restoration to NSR  -[id number] Score of 3; currently on Eliquis 5mg twice daily, no overt bleeding  -Rate-controlled on Lopressor 50mg twice daily and diltiazem ER 120mg daily     Patient reports medication compliance and feels quite well with no complaints today in the office.     Cardiovascular testing:  ANGELO (August, 2024)-LVSF is mildly decreased with an EF of 50%; reduced RVSF; biatrial dilatation; moderate plaque in the descending thoracic aorta and aortic arch  Nuclear stress test (August, 2024)-negative for ischemia   3-day monitor (June, 2024)-showing predominant rhythm Afib/flutter with average rate of 130 bpm; 100% burden of afib/flutter with >99% of time in RVR         Past Medical, Surgical, and Family History reviewed and updated in chart.     Reviewed all medications by prescribing practitioner or clinical pharmacist (such as prescriptions, OTCs, herbal therapies and supplements) and documented in the medical record.    Past Medical History  Past Medical History:   Diagnosis Date    Atrial fibrillation (Multi)     Hyperlipidemia     Hypertension     Type 2 diabetes mellitus (Multi)        Social History  Social History     Tobacco Use    Smoking status: Former     Types: Cigarettes    Smokeless tobacco: Never   Vaping Use    Vaping status: Never Used   Substance Use Topics    Alcohol use: Yes    Drug use: Never       Family History     Family History   Problem Relation Name Age of Onset    Heart attack Father      Diabetes Paternal Grandfather      Heart attack Paternal Grandfather      Cancer Other uncle         Allergies:  No Known Allergies     Outpatient Medications:  Current Outpatient Medications   Medication Instructions    apixaban (ELIQUIS) 5 mg, oral, 2 times daily    aspirin-caffeine (Georgi [employer]) 500-32.5 mg tablet oral     "atorvastatin (LIPITOR) 40 mg, oral, Daily    dilTIAZem CD (CARDIZEM CD) 120 mg, oral, Daily    empagliflozin (JARDIANCE) 25 mg, oral, Daily    glimepiride (AMARYL) 4 mg, oral, Daily before breakfast    losartan (COZAAR) 50 mg, oral, Daily    metFORMIN XR (GLUCOPHAGE-XR) 1,000 mg, oral, 2 times daily    metoprolol tartrate (LOPRESSOR) 100 mg, oral, 2 times daily    pioglitazone (ACTOS) 45 mg, oral, Daily    triamterene-hydrochlorothiazid (Maxzide) 75-50 mg tablet 1 tablet, oral, Daily          Labs:   CMP:  Recent Labs     08/17/24  1101 11/25/23  0810 11/22/22  0650 07/23/22  1029 02/12/22  1025    136 136 138 136   K 4.1 4.1 4.1 4.1 4.0    102 102 104 103   CO2 24 24 27 25 27   ANIONGAP 14 14 11 13 10   BUN 32* 26* 29* 31* 21   CREATININE 1.48* 1.22 1.24 1.38* 1.15   EGFR 51* 65  --   --   --      Recent Labs     11/25/23  0810 11/22/22  0650 02/12/22  1025 10/24/20  0756   ALBUMIN 4.1 4.1 4.1 4.2   ALKPHOS 54 55 48 55   ALT 17 19 24 19   AST 15 13 19 13   BILITOT 0.4 0.4 0.5 0.4     CBC:  Recent Labs     11/25/23  0810 10/24/20  0756 01/06/20  0858   WBC 7.1 6.7 7.5   HGB 16.6 16.3 15.8   HCT 52.9* 49.3 46.7    187 162   MCV 89 85 84     COAG: No results for input(s): \"PTT\", \"INR\", \"HAUF\", \"DDIMERVTE\", \"HAPTOGLOBIN\", \"FIBRINOGEN\" in the last 61798 hours.  ABO: No results for input(s): \"ABO\" in the last 50372 hours.  HEME/ENDO:  Recent Labs     07/19/24  1135 05/18/24  0836 11/25/23  0810 07/01/23  0818 02/27/23  0834 01/25/21  1410 10/24/20  0756   TSH 1.83  --   --   --   --   --  2.40   HGBA1C  --  8.4* 8.8* 8.7* 8.7*   < > 8.2    < > = values in this interval not displayed.      CARDIAC: No results for input(s): \"LDH\", \"CKMB\", \"TROPHS\", \"BNP\" in the last 75332 hours.    No lab exists for component: \"CK\", \"CKMBP\"  Recent Labs     11/25/23  0810 11/22/22  0650 02/12/22  1025 04/24/21  0730   CHOL 192 175 173 148   LDLF  --  96 111* 90   HDL 50.0 47.0 43.0 35.0*   TRIG 133 161* 96 113     MICRO: " "No results for input(s): \"ESR\", \"CRP\", \"PROCAL\" in the last 35714 hours.  No results found for the last 90 days.    Notable Studies: imaging personally reviewed   EKG:  Encounter Date: 08/22/24   ECG 12 lead   Result Value    Ventricular Rate 131    Atrial Rate 262    QRS Duration 100    QT Interval 276    QTC Calculation(Bazett) 407    P Axis 256    R Axis 124    T Axis 56    QRS Count 22    Q Onset 212    P Onset 134    P Offset 210    T Offset 350    QTC Fredericia 358    Narrative    Atrial flutter with 2:1 AV conduction  Right axis deviation  Incomplete right bundle branch block  Cannot rule out Anterior infarct (cited on or before 05-AUG-2024)  Abnormal ECG  When compared with ECG of 05-AUG-2024 07:15,  Questionable change in QRS axis  Confirmed by Tanvir Worrell ([id number]) on 8/27/2024 5:13:16 PM     Echocardiogram: No results found for this or any previous visit from the past 1825 days.    Stress Testing: No results found for this or any previous visit from the past 1825 days.    Cardiac Catheterization: No results found for this or any previous visit from the past 1825 days.  No results found for this or any previous visit from the past 3650 days.         REVIEW OF SYSTEMS  A 10-point system review was completed and was negative except as noted in the HPI.      VITALS  Vitals:    09/06/24 1109   BP: 116/62   Pulse: 78   SpO2: 96%       PHYSICAL EXAM  General: awake, alert and oriented. No acute distress.   Skin: Skin is warm, dry and intact without rashes or lesions.  HEENT: normocephalic, atraumatic; conjunctivae are clear without exudates or hemorrhage. Sclera is non-icteric. Eyelids are normal in appearance without swelling or lesions. Hearing intact. Nares are patent bilaterally. Moist mucous membranes.   Cardiovascular: heart rate and rhythm are normal. No murmurs, gallops, or rubs are auscultated. S1 and S2 are heard and are of normal intensity. No JVD, no carotid bruits  Respiratory: bilateral lung " sounds clear to auscultations without rales, rhonchi, or wheezes. No accessory muscle use or stridor  Gastrointestinal: obese  Genitourinary: exam deferred  Musculoskeletal: ROM intact, no deformities  Extremities: pulses palpable bilaterally; +1 to BLLE  Neurological: no focal deficits; gait steady  Psychiatric: appropriate mood and affect; good judgment and insight          ASSESSMENT AND PLAN  Assessment/Plan   Diagnoses and all orders for this visit:  Body mass index (BMI) 45.0-49.9, adult (Multi)  Atrial flutter, paroxysmal (Multi)  Secondary HTN  -IOEKG showing patient continues in NSR with controlled rates  -We reviewed ANGELO results which does show biatrial dilatation therefore, long-term success of DCCV is quite low. I would like to him to get established with Dr. Tenorio in the case he would need an ablation in the future and for further rhythm control  --[id number] Score of 3; currently on Eliquis 5mg twice daily, no overt bleeding  -Will change Lopressor 50mg twice daily to Toprol XL 100mg daily  -Continue diltiazem ER 120mg daily  -Patient informed if he were to notice increased palpitations, increased fatigue, shortness of breath, increased swelling in his lower legs to contact the office to come in for EKG; patient verbalized understanding        RTC: 3 months with Dr. Tenorio      Thank you for allowing me to participate in the care of this patient. Please reach me out if you have any questions or if you need any clarifications regarding the patient's care.    Neetu Hernandez, NILDA, APRN, FNP-C  Division of Cardiovascular Medicine  [facility]  [facility]

## 2024-09-06 ENCOUNTER — APPOINTMENT (OUTPATIENT)
Dept: CARDIOLOGY | Facility: CLINIC | Age: 69
End: 2024-09-06
Payer: COMMERCIAL

## 2024-09-06 VITALS
HEART RATE: 78 BPM | DIASTOLIC BLOOD PRESSURE: 62 MMHG | SYSTOLIC BLOOD PRESSURE: 116 MMHG | WEIGHT: 315 LBS | BODY MASS INDEX: 44.1 KG/M2 | HEIGHT: 71 IN | OXYGEN SATURATION: 96 %

## 2024-09-06 DIAGNOSIS — I15.9 SECONDARY HYPERTENSION: ICD-10-CM

## 2024-09-06 DIAGNOSIS — I48.92 ATRIAL FLUTTER, PAROXYSMAL (MULTI): ICD-10-CM

## 2024-09-06 RX ORDER — METOPROLOL SUCCINATE 100 MG/1
100 TABLET, EXTENDED RELEASE ORAL DAILY
Qty: 90 TABLET | Refills: 3 | Status: SHIPPED | OUTPATIENT
Start: 2024-09-06 | End: 2025-09-06

## 2024-09-13 ENCOUNTER — APPOINTMENT (OUTPATIENT)
Dept: PHARMACY | Facility: HOSPITAL | Age: 69
End: 2024-09-13
Payer: COMMERCIAL

## 2024-11-01 DIAGNOSIS — I48.0 PAROXYSMAL ATRIAL FIBRILLATION (MULTI): ICD-10-CM

## 2024-11-18 DIAGNOSIS — E11.9 TYPE 2 DIABETES MELLITUS WITHOUT COMPLICATION, WITHOUT LONG-TERM CURRENT USE OF INSULIN (MULTI): ICD-10-CM

## 2024-11-18 RX ORDER — GLIMEPIRIDE 4 MG/1
4 TABLET ORAL
Qty: 90 TABLET | Refills: 0 | OUTPATIENT
Start: 2024-11-18

## 2024-12-02 RX ORDER — GLIMEPIRIDE 4 MG/1
4 TABLET ORAL
Qty: 90 TABLET | Refills: 3 | Status: SHIPPED | OUTPATIENT
Start: 2024-12-02 | End: 2025-12-02

## 2024-12-16 ENCOUNTER — APPOINTMENT (OUTPATIENT)
Dept: PRIMARY CARE | Facility: CLINIC | Age: 69
End: 2024-12-16
Payer: COMMERCIAL

## 2024-12-19 ENCOUNTER — LAB (OUTPATIENT)
Dept: LAB | Facility: LAB | Age: 69
End: 2024-12-19
Payer: COMMERCIAL

## 2024-12-19 ENCOUNTER — APPOINTMENT (OUTPATIENT)
Dept: CARDIOLOGY | Facility: CLINIC | Age: 69
End: 2024-12-19
Payer: COMMERCIAL

## 2024-12-19 VITALS
SYSTOLIC BLOOD PRESSURE: 142 MMHG | WEIGHT: 315 LBS | OXYGEN SATURATION: 96 % | HEART RATE: 78 BPM | HEIGHT: 71 IN | BODY MASS INDEX: 44.1 KG/M2 | DIASTOLIC BLOOD PRESSURE: 80 MMHG

## 2024-12-19 DIAGNOSIS — I48.0 PAROXYSMAL ATRIAL FIBRILLATION (MULTI): ICD-10-CM

## 2024-12-19 DIAGNOSIS — I10 HYPERTENSION, UNSPECIFIED TYPE: ICD-10-CM

## 2024-12-19 DIAGNOSIS — Z12.5 SCREENING FOR PROSTATE CANCER: ICD-10-CM

## 2024-12-19 DIAGNOSIS — E11.9 TYPE 2 DIABETES MELLITUS WITHOUT COMPLICATION, WITHOUT LONG-TERM CURRENT USE OF INSULIN (MULTI): ICD-10-CM

## 2024-12-19 DIAGNOSIS — E78.2 MIXED DYSLIPIDEMIA: ICD-10-CM

## 2024-12-19 LAB
ALBUMIN SERPL BCP-MCNC: 3.9 G/DL (ref 3.4–5)
ALP SERPL-CCNC: 61 U/L (ref 33–136)
ALT SERPL W P-5'-P-CCNC: 17 U/L (ref 10–52)
ANION GAP SERPL CALC-SCNC: 12 MMOL/L (ref 10–20)
AST SERPL W P-5'-P-CCNC: 14 U/L (ref 9–39)
BILIRUB SERPL-MCNC: 0.6 MG/DL (ref 0–1.2)
BUN SERPL-MCNC: 33 MG/DL (ref 6–23)
CALCIUM SERPL-MCNC: 9.1 MG/DL (ref 8.6–10.3)
CHLORIDE SERPL-SCNC: 104 MMOL/L (ref 98–107)
CHOLEST SERPL-MCNC: 158 MG/DL (ref 0–199)
CHOLESTEROL/HDL RATIO: 3.4
CO2 SERPL-SCNC: 27 MMOL/L (ref 21–32)
CREAT SERPL-MCNC: 1.33 MG/DL (ref 0.5–1.3)
EGFRCR SERPLBLD CKD-EPI 2021: 58 ML/MIN/1.73M*2
ERYTHROCYTE [DISTWIDTH] IN BLOOD BY AUTOMATED COUNT: 14.9 % (ref 11.5–14.5)
EST. AVERAGE GLUCOSE BLD GHB EST-MCNC: 177 MG/DL
GLUCOSE SERPL-MCNC: 155 MG/DL (ref 74–99)
HBA1C MFR BLD: 7.8 %
HCT VFR BLD AUTO: 51.2 % (ref 41–52)
HDLC SERPL-MCNC: 46 MG/DL
HGB BLD-MCNC: 16.7 G/DL (ref 13.5–17.5)
LDLC SERPL CALC-MCNC: 92 MG/DL
MCH RBC QN AUTO: 29 PG (ref 26–34)
MCHC RBC AUTO-ENTMCNC: 32.6 G/DL (ref 32–36)
MCV RBC AUTO: 89 FL (ref 80–100)
NON HDL CHOLESTEROL: 112 MG/DL (ref 0–149)
NRBC BLD-RTO: 0 /100 WBCS (ref 0–0)
PLATELET # BLD AUTO: 161 X10*3/UL (ref 150–450)
POTASSIUM SERPL-SCNC: 4.2 MMOL/L (ref 3.5–5.3)
PROT SERPL-MCNC: 6.4 G/DL (ref 6.4–8.2)
PSA SERPL-MCNC: 1.6 NG/ML
RBC # BLD AUTO: 5.75 X10*6/UL (ref 4.5–5.9)
SODIUM SERPL-SCNC: 139 MMOL/L (ref 136–145)
TRIGL SERPL-MCNC: 100 MG/DL (ref 0–149)
VLDL: 20 MG/DL (ref 0–40)
WBC # BLD AUTO: 6.7 X10*3/UL (ref 4.4–11.3)

## 2024-12-19 PROCEDURE — G0103 PSA SCREENING: HCPCS

## 2024-12-19 PROCEDURE — G2211 COMPLEX E/M VISIT ADD ON: HCPCS | Performed by: STUDENT IN AN ORGANIZED HEALTH CARE EDUCATION/TRAINING PROGRAM

## 2024-12-19 PROCEDURE — 83036 HEMOGLOBIN GLYCOSYLATED A1C: CPT

## 2024-12-19 PROCEDURE — 99214 OFFICE O/P EST MOD 30 MIN: CPT | Performed by: STUDENT IN AN ORGANIZED HEALTH CARE EDUCATION/TRAINING PROGRAM

## 2024-12-19 PROCEDURE — 3079F DIAST BP 80-89 MM HG: CPT | Performed by: STUDENT IN AN ORGANIZED HEALTH CARE EDUCATION/TRAINING PROGRAM

## 2024-12-19 PROCEDURE — 1036F TOBACCO NON-USER: CPT | Performed by: STUDENT IN AN ORGANIZED HEALTH CARE EDUCATION/TRAINING PROGRAM

## 2024-12-19 PROCEDURE — 4010F ACE/ARB THERAPY RXD/TAKEN: CPT | Performed by: STUDENT IN AN ORGANIZED HEALTH CARE EDUCATION/TRAINING PROGRAM

## 2024-12-19 PROCEDURE — 93000 ELECTROCARDIOGRAM COMPLETE: CPT | Performed by: STUDENT IN AN ORGANIZED HEALTH CARE EDUCATION/TRAINING PROGRAM

## 2024-12-19 PROCEDURE — 3048F LDL-C <100 MG/DL: CPT | Performed by: STUDENT IN AN ORGANIZED HEALTH CARE EDUCATION/TRAINING PROGRAM

## 2024-12-19 PROCEDURE — 36415 COLL VENOUS BLD VENIPUNCTURE: CPT

## 2024-12-19 PROCEDURE — 85027 COMPLETE CBC AUTOMATED: CPT

## 2024-12-19 PROCEDURE — 80053 COMPREHEN METABOLIC PANEL: CPT

## 2024-12-19 PROCEDURE — 3077F SYST BP >= 140 MM HG: CPT | Performed by: STUDENT IN AN ORGANIZED HEALTH CARE EDUCATION/TRAINING PROGRAM

## 2024-12-19 PROCEDURE — 1159F MED LIST DOCD IN RCRD: CPT | Performed by: STUDENT IN AN ORGANIZED HEALTH CARE EDUCATION/TRAINING PROGRAM

## 2024-12-19 PROCEDURE — 3008F BODY MASS INDEX DOCD: CPT | Performed by: STUDENT IN AN ORGANIZED HEALTH CARE EDUCATION/TRAINING PROGRAM

## 2024-12-19 PROCEDURE — 3052F HG A1C>EQUAL 8.0%<EQUAL 9.0%: CPT | Performed by: STUDENT IN AN ORGANIZED HEALTH CARE EDUCATION/TRAINING PROGRAM

## 2024-12-19 PROCEDURE — 80061 LIPID PANEL: CPT

## 2024-12-19 NOTE — PROGRESS NOTES
"    Cardiac Electrophysiology Office Visit     Referred by  No ref. provider found for   Chief Complaint   Patient presents with    New Patient Visit     Flutter     HPI:  Rangel Tavarez is a 69 y.o. year old male patient with h/o diabetes, HTN, atrial flutter presenting today to establish care    Objective  Current Outpatient Medications   Medication Instructions    apixaban (ELIQUIS) 5 mg, oral, 2 times daily    aspirin-caffeine (Georgi Back and Body) 500-32.5 mg tablet Take by mouth.    atorvastatin (LIPITOR) 40 mg, oral, Daily    dilTIAZem CD (CARDIZEM CD) 120 mg, oral, Daily    empagliflozin (JARDIANCE) 25 mg, oral, Daily    glimepiride (AMARYL) 4 mg, oral, Daily before breakfast    losartan (COZAAR) 50 mg, oral, Daily    metFORMIN XR (GLUCOPHAGE-XR) 1,000 mg, oral, 2 times daily    metoprolol succinate XL (TOPROL-XL) 100 mg, oral, Daily, Do not crush or chew.    pioglitazone (ACTOS) 45 mg, oral, Daily    triamterene-hydrochlorothiazid (Maxzide) 75-50 mg tablet 1 tablet, oral, Daily         Visit Vitals  /80   Pulse 78   Ht 1.803 m (5' 11\")   Wt (!) 152 kg (334 lb 6.4 oz)   SpO2 96%   BMI 46.64 kg/m²   Smoking Status Former   BSA 2.76 m²      Physical Exam  Vitals reviewed.   Constitutional:       Appearance: Normal appearance. He is obese.   HENT:      Head: Normocephalic.   Cardiovascular:      Rate and Rhythm: Normal rate and regular rhythm.   Pulmonary:      Effort: Pulmonary effort is normal. No respiratory distress.      Breath sounds: No wheezing.   Skin:     General: Skin is warm and dry.      Capillary Refill: Capillary refill takes less than 2 seconds.   Neurological:      Mental Status: He is alert.   Psychiatric:         Mood and Affect: Mood normal.         My Interpretation of Reviewed Study(s):  Transesophageal echo (August 2024): Normal LV function with EF of 50%.  Moderately dilated left atrium and right atrium.  3-day cardiac Monitor (July 2024): Atrial flutter predominant rhythm " with average heart rate 130 bpm.  Nuclear stress Test (August 2024): Negative myocardial perfusion imaging without evidence of inducible ischemia  [id number] Score  Age 65-74: 1   Sex Male: 0   CHF History No: 0   HTN Yes: 1   Stroke/TIA/Thromboembolism No: 0   Vascular Dz: CAD/PAD/Aortic Plaque No: 0   DM Yes: 1   Total Score 3     Assessment/Plan   # Typical atrial flutter  Prior ECGs from July show clear typical atrial flutter with 2 1 AV conduction.  Patient subsequently underwent a ANGELO cardioversion and August and has maintained sinus rhythm since then.  1 in atrial flutter patient reported feeling palpitations and shortness of breath and since has been in sinus rhythm he has not felt any of the symptoms again.  Patient has been on anticoagulation since then.  He is overall CHADS2 Vascor is elevated due to diabetes age and hypertension however since his atrial flutter and patient has been maintaining sinus rhythm long-term anticoagulation is not quite necessary.  That being said we did discuss the coexistence of atrial fibrillation and a significant portion of patients with atrial flutter in which case patient should be on lifelong ventricularization.  Based on ECG reviews since his original diagnosis patient has never had evidence of atrial fibrillation and currently is not on any antiarrhythmic therapy that would be suppressing and maintaining sinus rhythm.  After a long discussion with the patient we discussed that he could stop anticoagulation at this time and resume as soon as he felt any symptoms and we could confirm that he was having atrial arrhythmias  Long-term management of his atrial arrhythmias have a few options  #1 if patient has recurrence then likely proceed with ablative strategy especially if it is typical flutter due to the high probability of a curative procedure.  If patient has atrial flutter and atrial fibrillation then still ablative strategy can be planned  #2 we could consider  initiation of antiarrhythmic therapy to prevent arrhythmia but patient is currently been symptomatic free and therefore we will continue holding off any initiation of antiarrhythmic this time.  However if patient has recurrence of atrial arrhythmias including atrial flutter or atrial fibrillation then initiation of antiarrhythmic therapy like flecainide could be very reasonable but may need to be done in the context of a cardioversion out of the existing rhythm back to sinus rhythm  Plan to continue to monitor for recurrence for AF/Fl - if recurs then plan for Ablations >> Flecainide  Stop Apixaban at this time    Return to Clinic: Will transition case to our APRN, patient can be referred back to me if new concerns arise or significant change in management needed    Pepe Tenorio MD [facility]  Cardiac Electrophysiology  [email or web address]    **Disclaimer: This note was dictated by speech recognition, and every effort has been made to prevent any error in transcription, however minor errors may be present**

## 2024-12-23 ENCOUNTER — APPOINTMENT (OUTPATIENT)
Dept: PRIMARY CARE | Facility: CLINIC | Age: 69
End: 2024-12-23
Payer: COMMERCIAL

## 2024-12-23 VITALS
OXYGEN SATURATION: 96 % | HEIGHT: 71 IN | HEART RATE: 79 BPM | SYSTOLIC BLOOD PRESSURE: 120 MMHG | WEIGHT: 315 LBS | DIASTOLIC BLOOD PRESSURE: 60 MMHG | BODY MASS INDEX: 44.1 KG/M2

## 2024-12-23 DIAGNOSIS — E78.2 MIXED DYSLIPIDEMIA: ICD-10-CM

## 2024-12-23 DIAGNOSIS — G47.33 OSA (OBSTRUCTIVE SLEEP APNEA): ICD-10-CM

## 2024-12-23 DIAGNOSIS — E11.9 TYPE 2 DIABETES MELLITUS WITHOUT COMPLICATION, WITHOUT LONG-TERM CURRENT USE OF INSULIN (MULTI): ICD-10-CM

## 2024-12-23 DIAGNOSIS — I48.0 PAROXYSMAL ATRIAL FIBRILLATION (MULTI): ICD-10-CM

## 2024-12-23 DIAGNOSIS — Z00.00 ROUTINE GENERAL MEDICAL EXAMINATION AT HEALTH CARE FACILITY: Primary | ICD-10-CM

## 2024-12-23 DIAGNOSIS — I10 PRIMARY HYPERTENSION: ICD-10-CM

## 2024-12-23 PROCEDURE — 1124F ACP DISCUSS-NO DSCNMKR DOCD: CPT

## 2024-12-23 PROCEDURE — 3074F SYST BP LT 130 MM HG: CPT

## 2024-12-23 PROCEDURE — 1170F FXNL STATUS ASSESSED: CPT

## 2024-12-23 PROCEDURE — 3078F DIAST BP <80 MM HG: CPT

## 2024-12-23 PROCEDURE — 99214 OFFICE O/P EST MOD 30 MIN: CPT

## 2024-12-23 PROCEDURE — 3048F LDL-C <100 MG/DL: CPT

## 2024-12-23 PROCEDURE — 1159F MED LIST DOCD IN RCRD: CPT

## 2024-12-23 PROCEDURE — G0439 PPPS, SUBSEQ VISIT: HCPCS

## 2024-12-23 PROCEDURE — 3051F HG A1C>EQUAL 7.0%<8.0%: CPT

## 2024-12-23 PROCEDURE — 4010F ACE/ARB THERAPY RXD/TAKEN: CPT

## 2024-12-23 PROCEDURE — 1036F TOBACCO NON-USER: CPT

## 2024-12-23 PROCEDURE — 3008F BODY MASS INDEX DOCD: CPT

## 2024-12-23 ASSESSMENT — ACTIVITIES OF DAILY LIVING (ADL)
MANAGING_FINANCES: INDEPENDENT
DOING_HOUSEWORK: INDEPENDENT
TAKING_MEDICATION: INDEPENDENT
GROCERY_SHOPPING: INDEPENDENT
BATHING: INDEPENDENT
DRESSING: INDEPENDENT

## 2024-12-23 ASSESSMENT — ENCOUNTER SYMPTOMS
CARDIOVASCULAR NEGATIVE: 1
RESPIRATORY NEGATIVE: 1
GASTROINTESTINAL NEGATIVE: 1
CONSTITUTIONAL NEGATIVE: 1

## 2024-12-23 NOTE — PROGRESS NOTES
"Subjective   Reason for Visit: Rangel Tavarze is an 69 y.o. male here for a Medicare Wellness visit.     Past Medical, Surgical, and Family History reviewed and updated in chart.    Reviewed all medications by prescribing practitioner or clinical pharmacist (such as prescriptions, OTCs, herbal therapies and supplements) and documented in the medical record.    HPI  DM2: A1C 7.8 last check. Not checking sugar at home. Eye exams yearly in [place]. Daily foot checks, denies neuropathy. Compliant with medications, no SE. No lows.   HTN: BP in office good 120/60. Does not check at home. Denies CP/HA/dizziness/visual changes. Compliant w/medications, no SE.  HYPERLIPIDEMIA: Last lipids good with slightly LDL. Compliant with statin, no SE.  ATRIAL FLUTTER/AFIB: Following with cardio, not recommending anticoagulation currently. Had cardioversion 2024, doing well.     PSA WNL 2024.  Denies family Hx of colon cancer, denies colon cancer screening at this time.   Denies lung cancer screening at this time.  Denies age appropriate vaccines at this time.     [employer].    Patient Care Team:  Antonio Reece PA-C as PCP - General  Robert Knapp MD as PCP - [facility] [employer] PCP     Review of Systems   Constitutional: Negative.    Respiratory: Negative.     Cardiovascular: Negative.    Gastrointestinal: Negative.        Objective   Vitals:  /60   Pulse 79   Ht 1.803 m (5' 11\")   Wt (!) 152 kg (334 lb)   SpO2 96%   BMI 46.58 kg/m²       Physical Exam  Constitutional:       General: He is not in acute distress.     Appearance: Normal appearance. He is not ill-appearing.   HENT:      Head: Normocephalic and atraumatic.   Eyes:      Extraocular Movements: Extraocular movements intact.      Conjunctiva/sclera: Conjunctivae normal.   Cardiovascular:      Rate and Rhythm: Normal rate.   Pulmonary:      Effort: Pulmonary effort is normal.   Abdominal:      General: There is no distension. "   Musculoskeletal:         General: Normal range of motion.      Cervical back: Normal range of motion.   Skin:     General: Skin is warm and dry.   Neurological:      General: No focal deficit present.      Mental Status: He is alert and oriented to person, place, and time.   Psychiatric:         Mood and Affect: Mood normal.         Behavior: Behavior normal.         Thought Content: Thought content normal.         Judgment: Judgment normal.         Assessment & Plan  Type 2 diabetes mellitus without complication, without long-term current use of insulin (Multi)    Orders:    CBC; Future    Comprehensive Metabolic Panel; Future    Hemoglobin A1C; Future    Primary hypertension         Mixed dyslipidemia         ALEKS (obstructive sleep apnea)         Paroxysmal atrial fibrillation (Multi)         Routine general medical examination at health care facility                 Chronic conditions stable.  No medication changes today.  Labs reviewed and stable.  Follow up 6 months with nonfasting labs prior.

## 2024-12-24 ENCOUNTER — APPOINTMENT (OUTPATIENT)
Dept: CARDIOLOGY | Facility: CLINIC | Age: 69
End: 2024-12-24
Payer: COMMERCIAL

## 2025-03-24 ENCOUNTER — APPOINTMENT (OUTPATIENT)
Dept: CARDIOLOGY | Facility: CLINIC | Age: 70
End: 2025-03-24
Payer: COMMERCIAL

## 2025-03-25 ENCOUNTER — APPOINTMENT (OUTPATIENT)
Dept: CARDIOLOGY | Facility: CLINIC | Age: 70
End: 2025-03-25
Payer: MEDICARE

## 2025-03-27 NOTE — PROGRESS NOTES
[facility]  Electrophysiology Clinic Visit Note    History of present illness:  This is a 69 y.o. male with a history of diabetes, hypertension, obstructive sleep apnea and atrial flutter who presents to the clinic for 3-month follow-up    Subjective:  He denies chest pain, shortness of breath, palpitations, leg edema, fever, chills, orthopnea, paroxysmal nocturnal dyspnea or syncope.   His previous a flutter symptoms were feeling fast heart rates.  He has not felt those since his cardioversion.    Cardiac testing  Transesophageal echo (August 2024): Normal LV function with EF of 50%.  Moderately dilated left atrium and right atrium.  3-day cardiac Monitor (July 2024): Atrial flutter predominant rhythm with average heart rate 130 bpm.  Nuclear stress Test (August 2024): Negative myocardial perfusion imaging without evidence of inducible ischemia    Assessment and Plan  Typical atrial flutter  -Status post ANGELO cardioversion August 2024  -Not on direct oral anticoagulation due to no evidence of atrial fibrillation  -If has any recurrence of atrial flutter or evidence of atrial fibrillation we will restart anticoagulation and proceed with ablative strategy.  -Dr. Tenorio discussed with the patient with the benefits of antiarrhythmic therapy to prevent atrial arrhythmias however her patient deferred to the use of antiarrhythmic drug at that time.  -Continue with diltiazem 120 mg daily and Toprol- mg daily.    Mixed hyperlipidemia  -LDL 92 lipid panel December 2024  -Continue with high intensity statin therapy    Essential hypertension  -Well-controlled on losartan 50 mg daily and triamterene-hydrochlorothiazide 75-50 mg tablet daily    Return to Care:  1 year    Objective  VITALS  Vitals:    03/31/25 0917   BP: 122/60   Pulse: 86   SpO2: 94%       Weight  Vitals:    03/31/25 0917   Weight: (!) 157 kg (345 lb 11.2 oz)       Past Medical History  Past Medical History:   Diagnosis Date    Atrial  fibrillation (Multi)     Hyperlipidemia     Hypertension     Type 2 diabetes mellitus        Past Surgical History  Past Surgical History:   Procedure Laterality Date    OTHER SURGICAL HISTORY  01/09/2020    Tonsillectomy    OTHER SURGICAL HISTORY  01/09/2020    Hernia repair       Medications  Current Outpatient Medications   Medication Instructions    aspirin-caffeine (Georgi Back and Body) 500-32.5 mg tablet Take by mouth.    atorvastatin (LIPITOR) 40 mg, oral, Daily    dilTIAZem CD (CARDIZEM CD) 120 mg, oral, Daily    empagliflozin (JARDIANCE) 25 mg, oral, Daily    glimepiride (AMARYL) 4 mg, oral, Daily before breakfast    losartan (COZAAR) 50 mg, oral, Daily    metFORMIN XR (GLUCOPHAGE-XR) 1,000 mg, oral, 2 times daily    metoprolol succinate XL (TOPROL-XL) 100 mg, oral, Daily, Do not crush or chew.    pioglitazone (ACTOS) 45 mg, oral, Daily    triamterene-hydrochlorothiazid (Maxzide) 75-50 mg tablet 1 tablet, oral, Daily       Allergies  No Known Allergies    Social History  Social History     Tobacco Use    Smoking status: Former     Types: Cigarettes    Smokeless tobacco: Never   Vaping Use    Vaping status: Never Used   Substance Use Topics    Alcohol use: Not Currently    Drug use: Never       Family History  Family History   Problem Relation Name Age of Onset    Heart attack Father      Diabetes Paternal Grandfather      Heart attack Paternal Grandfather      Cancer Other uncle            PHYSICAL EXAM  Physical Exam  Vitals and nursing note reviewed.   Constitutional:       General: He is not in acute distress.     Appearance: He is obese.   HENT:      Head: Normocephalic and atraumatic.      Mouth/Throat:      Mouth: Mucous membranes are moist.      Pharynx: Oropharynx is clear.   Eyes:      General: No scleral icterus.     Pupils: Pupils are equal, round, and reactive to light.   Cardiovascular:      Rate and Rhythm: Normal rate and regular rhythm.      Pulses: Normal pulses.      Heart sounds: Normal  heart sounds, S1 normal and S2 normal. No murmur heard.     No friction rub.   Pulmonary:      Effort: Pulmonary effort is normal.      Breath sounds: Normal breath sounds.   Abdominal:      General: Bowel sounds are normal. There is no distension.      Palpations: Abdomen is soft.      Tenderness: There is no abdominal tenderness.   Musculoskeletal:         General: Normal range of motion.      Cervical back: Normal range of motion and neck supple.      Right lower leg: No edema.      Left lower leg: No edema.   Skin:     General: Skin is warm and dry.      Capillary Refill: Capillary refill takes less than 2 seconds.      Findings: No rash.   Neurological:      General: No focal deficit present.      Mental Status: He is alert.   Psychiatric:         Mood and Affect: Mood normal.         Behavior: Behavior normal.         Cardiovascular Labs  Lab Results   Component Value Date    HGB 16.7 12/19/2024    HGB 16.6 11/25/2023    HGB 16.3 10/24/2020    HGB 15.8 01/06/2020     12/19/2024    WBC 6.7 12/19/2024     12/19/2024    K 4.2 12/19/2024    CREATININE 1.33 (H) 12/19/2024    CREATININE 1.48 (H) 08/17/2024    CREATININE 1.22 11/25/2023    BUN 33 (H) 12/19/2024    CALCIUM 9.1 12/19/2024    LDLF 96 11/22/2022       Echocardiogram  Results for orders placed during the hospital encounter of 08/22/24    Transesophageal Echo (ANGELO) With Possible Cardioversion    [place] [address]  Phone [phone], Fax [phone]    TRANSESOPHAGEAL ECHOCARDIOGRAM REPORT    Patient Name:      YAMILETH JAY NATASHA Moreno Physician:    [id number] Tanvir Worrell MD  Study Date:        8/22/2024             Ordering Provider:    [id number] LUIS HAMILTON  MRN/PID:           [id number]              Fellow:  Accession#:        [id number]          Nurse:  Date of Birth/Age: 1955 / 69 years  Sonographer:          Rajinder Brasher RD  Gender:            M                      Additional Staff:  Height:            177.80 cm             Admit Date:  Weight:            150.14 kg             Admission Status:  BSA / BMI:         2.59 m2 / 47.49 kg/m2 Department Location:  [facility] PACU/OR  Blood Pressure: 135 /74 mmHg    Study Type:    TRANSESOPHAGEAL ECHO (ANGELO) W/ POSSIBLE CARDIOVERSION  Diagnosis/ICD: Atypical atrial flutter-I48.4  Indication:    Atrial Flutter  CPT Codes:     ANGELO Complete-23157; Cardioversion-65213  Study Detail: The following Echo studies were performed: 2D, M-Mode, Doppler and  color flow. Agitated saline used as a contrast agent for  intraseptal flow evaluation.      PHYSICIAN INTERPRETATION:  ANGELO Details: The ANGELO probe used was [id number]. Technically adequate omniplane transesophageal echocardiogram performed. Agitated saline contrast echo was performed to assess for the presence of a patent foramen ovale.  ANGELO Medication: The patient was sedated by Anesthesia; please refer to anesthesia flow sheet for medications used.  ANGELO Procedure: The probe was passed without difficulty. The following complication was encountered during the procedure: Patient tolerated the procedure well without any apparent complications.  ANGELO Cardioversion Procedure:  One synchronized biphasic external shock was delivered externally at 200 Joules in attempt to cardiovert the patient from atrial flutter. The procedure was successful, resulting in normal sinus rhythm.  Left Ventricle: The left ventricular systolic function is mildly decreased, with a visually estimated ejection fraction of 50%. The patient is in atrial fibrillation which may influence the estimate of left ventricular function and transvalvular flows. There are no regional wall motion abnormalities. The left ventricular cavity size is normal. Left ventricular diastolic filling was not assessed.  Left Atrium: The left atrium is moderately dilated. A bubble study using agitated saline was performed. Bubble study is  negative. The left atrial appendage Doppler velocities are reduced and there is no thrombus visualized in the left atrial appendage.  Right Ventricle: The right ventricle is normal in size. There is reduced right ventricular systolic function.  Right Atrium: The right atrium is moderately dilated.  Aortic Valve: The aortic valve is trileaflet. There is no evidence of aortic valve regurgitation.  Mitral Valve: The mitral valve is normal in structure. There is mild mitral valve regurgitation.  Tricuspid Valve: The tricuspid valve is structurally normal. There is trace tricuspid regurgitation.  Pulmonic Valve: The pulmonic valve is structurally normal. The pulmonic valve regurgitation was not well visualized.  Pericardium: There is a small pericardial effusion.  Aorta: The aortic root is normal. Moderate atherosclerotic plaque in the descending thoracic aorta and aortic arch.  Systemic Veins: 1 out of 4 (left upper) pulmonary vein interrogated with normal spectral Doppler.      CONCLUSIONS:  1. The left ventricular systolic function is mildly decreased, with a visually estimated ejection fraction of 50%.  2. There is reduced right ventricular systolic function.  3. The left atrium is moderately dilated.  4. The right atrium is moderately dilated.  5. Moderate atherosclerotic plaque in the descending thoracic aorta and aortic arch.  6. The patient is in atrial fibrillation which may influence the estimate of left ventricular function and transvalvular flows.  7. Successful direct current cardioversion for atrial flutter resulting in normal sinus rhythm.    RECOMMENDATIONS:  Recommendations for this patient include anticoagulation.    QUANTITATIVE DATA SUMMARY:  LV SYSTOLIC FUNCTION BY 2D PLANIMETRY (MOD):  Normal Ranges:  EF-Visual:      50 %  LV EF Reported: 50 %      [id number] Tanvir Worrell MD  Electronically signed on 8/22/2024 at 10:40:41 AM        ** Final **       The 10-year ASCVD risk score (Luis DK, et al.,  2019) is: 26.9%    Values used to calculate the score:      Age: 69 years      Sex: Male      Is Non- : No      Diabetic: Yes      Tobacco smoker: No      Systolic Blood Pressure: 120 mmHg      Is BP treated: Yes      HDL Cholesterol: 46 mg/dL      Total Cholesterol: 158 mg/dL  Low Risk: <5%  Borderline Risk: 5%-7.4%  Intermediate Risk: 7.5% - 19.9%  High Risk: >20%    If your symptoms worsen or progress please go directory to your nearest emergency department for evaluation.     Thank you for this interesting clinical case and allowing me to participate in the care of this patient. Please reach me out if you have any questions or if you need any clarifications regarding this patient's care.    **Disclaimer: This note was dictated by speech recognition, and every effort has been made to prevent any error in transcription, however minor errors may be present**  ___________________________________________________  Oliver Medina, MSN, CNP, ACNPC, CCRN  Advanced Practice Provider, Nurse Practitioner  Division of Cardiovascular Medicine  [facility]  [facility] but the patient has been asymptomatic Center with no recurrence of atrial rhythm he is

## 2025-03-31 ENCOUNTER — APPOINTMENT (OUTPATIENT)
Dept: CARDIOLOGY | Facility: CLINIC | Age: 70
End: 2025-03-31
Payer: MEDICARE

## 2025-03-31 VITALS
HEART RATE: 86 BPM | OXYGEN SATURATION: 94 % | DIASTOLIC BLOOD PRESSURE: 60 MMHG | HEIGHT: 71 IN | WEIGHT: 315 LBS | SYSTOLIC BLOOD PRESSURE: 122 MMHG | BODY MASS INDEX: 44.1 KG/M2

## 2025-03-31 DIAGNOSIS — E78.2 MIXED DYSLIPIDEMIA: ICD-10-CM

## 2025-03-31 DIAGNOSIS — I10 PRIMARY HYPERTENSION: ICD-10-CM

## 2025-03-31 DIAGNOSIS — I48.3 TYPICAL ATRIAL FLUTTER (MULTI): Primary | ICD-10-CM

## 2025-03-31 PROCEDURE — 1159F MED LIST DOCD IN RCRD: CPT

## 2025-03-31 PROCEDURE — 3078F DIAST BP <80 MM HG: CPT

## 2025-03-31 PROCEDURE — 3074F SYST BP LT 130 MM HG: CPT

## 2025-03-31 PROCEDURE — 1036F TOBACCO NON-USER: CPT

## 2025-03-31 PROCEDURE — 99214 OFFICE O/P EST MOD 30 MIN: CPT

## 2025-03-31 PROCEDURE — 4010F ACE/ARB THERAPY RXD/TAKEN: CPT

## 2025-03-31 PROCEDURE — 3008F BODY MASS INDEX DOCD: CPT

## 2025-06-10 ENCOUNTER — TELEPHONE (OUTPATIENT)
Dept: PRIMARY CARE | Facility: CLINIC | Age: 70
End: 2025-06-10
Payer: MEDICARE

## 2025-06-10 LAB
ALBUMIN SERPL-MCNC: 4 G/DL (ref 3.6–5.1)
ALP SERPL-CCNC: 61 U/L (ref 35–144)
ALT SERPL-CCNC: 16 U/L (ref 9–46)
ANION GAP SERPL CALCULATED.4IONS-SCNC: 10 MMOL/L (CALC) (ref 7–17)
AST SERPL-CCNC: 14 U/L (ref 10–35)
BILIRUB SERPL-MCNC: 0.5 MG/DL (ref 0.2–1.2)
BUN SERPL-MCNC: 26 MG/DL (ref 7–25)
CALCIUM SERPL-MCNC: 9.3 MG/DL (ref 8.6–10.3)
CHLORIDE SERPL-SCNC: 105 MMOL/L (ref 98–110)
CO2 SERPL-SCNC: 26 MMOL/L (ref 20–32)
CREAT SERPL-MCNC: 1.26 MG/DL (ref 0.7–1.35)
EGFRCR SERPLBLD CKD-EPI 2021: 62 ML/MIN/1.73M2
ERYTHROCYTE [DISTWIDTH] IN BLOOD BY AUTOMATED COUNT: 15.2 % (ref 11–15)
EST. AVERAGE GLUCOSE BLD GHB EST-MCNC: 189 MG/DL
EST. AVERAGE GLUCOSE BLD GHB EST-SCNC: 10.4 MMOL/L
GLUCOSE SERPL-MCNC: 191 MG/DL (ref 65–99)
HBA1C MFR BLD: 8.2 %
HCT VFR BLD AUTO: 51.9 % (ref 38.5–50)
HGB BLD-MCNC: 16.7 G/DL (ref 13.2–17.1)
MCH RBC QN AUTO: 29 PG (ref 27–33)
MCHC RBC AUTO-ENTMCNC: 32.2 G/DL (ref 32–36)
MCV RBC AUTO: 90.3 FL (ref 80–100)
PLATELET # BLD AUTO: 158 THOUSAND/UL (ref 140–400)
PMV BLD REES-ECKER: 11.2 FL (ref 7.5–12.5)
POTASSIUM SERPL-SCNC: 3.9 MMOL/L (ref 3.5–5.3)
PROT SERPL-MCNC: 6.3 G/DL (ref 6.1–8.1)
RBC # BLD AUTO: 5.75 MILLION/UL (ref 4.2–5.8)
SODIUM SERPL-SCNC: 141 MMOL/L (ref 135–146)
WBC # BLD AUTO: 6.8 THOUSAND/UL (ref 3.8–10.8)

## 2025-06-30 ENCOUNTER — APPOINTMENT (OUTPATIENT)
Dept: PRIMARY CARE | Facility: CLINIC | Age: 70
End: 2025-06-30
Payer: COMMERCIAL

## 2025-06-30 VITALS
HEIGHT: 71 IN | BODY MASS INDEX: 44.1 KG/M2 | SYSTOLIC BLOOD PRESSURE: 124 MMHG | OXYGEN SATURATION: 96 % | HEART RATE: 76 BPM | DIASTOLIC BLOOD PRESSURE: 80 MMHG | WEIGHT: 315 LBS

## 2025-06-30 DIAGNOSIS — E78.2 MIXED DYSLIPIDEMIA: Primary | ICD-10-CM

## 2025-06-30 DIAGNOSIS — Z12.5 SCREENING FOR PROSTATE CANCER: ICD-10-CM

## 2025-06-30 DIAGNOSIS — I10 HYPERTENSION, UNSPECIFIED TYPE: ICD-10-CM

## 2025-06-30 DIAGNOSIS — G47.33 OSA (OBSTRUCTIVE SLEEP APNEA): ICD-10-CM

## 2025-06-30 DIAGNOSIS — E11.9 TYPE 2 DIABETES MELLITUS WITHOUT COMPLICATION, WITHOUT LONG-TERM CURRENT USE OF INSULIN: ICD-10-CM

## 2025-06-30 DIAGNOSIS — I48.0 PAROXYSMAL ATRIAL FIBRILLATION (MULTI): ICD-10-CM

## 2025-06-30 PROCEDURE — 99214 OFFICE O/P EST MOD 30 MIN: CPT

## 2025-06-30 PROCEDURE — 3074F SYST BP LT 130 MM HG: CPT

## 2025-06-30 PROCEDURE — 1036F TOBACCO NON-USER: CPT

## 2025-06-30 PROCEDURE — 1159F MED LIST DOCD IN RCRD: CPT

## 2025-06-30 PROCEDURE — 4010F ACE/ARB THERAPY RXD/TAKEN: CPT

## 2025-06-30 PROCEDURE — 3079F DIAST BP 80-89 MM HG: CPT

## 2025-06-30 PROCEDURE — 3008F BODY MASS INDEX DOCD: CPT

## 2025-06-30 RX ORDER — LOSARTAN POTASSIUM 50 MG/1
50 TABLET ORAL DAILY
Qty: 90 TABLET | Refills: 3 | Status: SHIPPED | OUTPATIENT
Start: 2025-06-30 | End: 2026-06-30

## 2025-06-30 RX ORDER — PIOGLITAZONE 45 MG/1
45 TABLET ORAL DAILY
Qty: 90 TABLET | Refills: 3 | Status: SHIPPED | OUTPATIENT
Start: 2025-06-30 | End: 2026-06-30

## 2025-06-30 RX ORDER — METFORMIN HYDROCHLORIDE 500 MG/1
1000 TABLET, EXTENDED RELEASE ORAL 2 TIMES DAILY
Qty: 360 TABLET | Refills: 3 | Status: SHIPPED | OUTPATIENT
Start: 2025-06-30 | End: 2026-06-30

## 2025-06-30 RX ORDER — TRIAMTERENE AND HYDROCHLOROTHIAZIDE 75; 50 MG/1; MG/1
1 TABLET ORAL DAILY
Qty: 90 TABLET | Refills: 3 | Status: SHIPPED | OUTPATIENT
Start: 2025-06-30 | End: 2026-06-30

## 2025-06-30 ASSESSMENT — PATIENT HEALTH QUESTIONNAIRE - PHQ9
SUM OF ALL RESPONSES TO PHQ9 QUESTIONS 1 AND 2: 0
2. FEELING DOWN, DEPRESSED OR HOPELESS: NOT AT ALL
1. LITTLE INTEREST OR PLEASURE IN DOING THINGS: NOT AT ALL

## 2025-06-30 ASSESSMENT — ENCOUNTER SYMPTOMS
CARDIOVASCULAR NEGATIVE: 1
CONSTITUTIONAL NEGATIVE: 1
RESPIRATORY NEGATIVE: 1
GASTROINTESTINAL NEGATIVE: 1

## 2025-06-30 NOTE — PROGRESS NOTES
"Subjective   Patient ID: Rangel Tavarez is a 70 y.o. male who presents for pt here for 6 month med check .    HPI   DM2: A1C 8.2 last check. Not checking sugar at home. Eye exams yearly in [place]. Daily foot checks, denies neuropathy. Compliant with medications, no SE. No lows.   HTN: BP in office good 124/80. Does not check at home. Denies CP/HA/dizziness/visual changes. Compliant w/medications, no SE.  HYPERLIPIDEMIA: Last lipids good. Compliant with statin, no SE.  ATRIAL FLUTTER/AFIB: Following with cardio, not recommending anticoagulation currently. Had cardioversion 2024, doing well.     PSA WNL 2024.  Denies family Hx of colon cancer, denies colon cancer screening at this time.   Denies lung cancer screening at this time.  Denies age appropriate vaccines at this time.     Drives truck, hauls sand.    Review of Systems   Constitutional: Negative.    Respiratory: Negative.     Cardiovascular: Negative.    Gastrointestinal: Negative.        Objective   /80   Pulse 76   Ht 1.803 m (5' 11\")   Wt (!) 152 kg (336 lb)   SpO2 96%   BMI 46.86 kg/m²     Physical Exam  Constitutional:       General: He is not in acute distress.     Appearance: Normal appearance. He is not ill-appearing.   HENT:      Head: Normocephalic and atraumatic.   Eyes:      Extraocular Movements: Extraocular movements intact.      Conjunctiva/sclera: Conjunctivae normal.   Cardiovascular:      Rate and Rhythm: Normal rate.   Pulmonary:      Effort: Pulmonary effort is normal.   Abdominal:      General: There is no distension.   Musculoskeletal:         General: Normal range of motion.      Cervical back: Normal range of motion.   Skin:     General: Skin is warm and dry.   Neurological:      General: No focal deficit present.      Mental Status: He is alert and oriented to person, place, and time.   Psychiatric:         Mood and Affect: Mood normal.         Behavior: Behavior normal.         Thought Content: Thought content normal.    "      Judgment: Judgment normal.         Assessment/Plan        Chronic conditions stable.  No medication changes today.  Labs reviewed and stable.  Follow up 6 months with fasting labs prior.

## 2025-07-22 DIAGNOSIS — E78.2 MIXED DYSLIPIDEMIA: ICD-10-CM

## 2025-07-22 RX ORDER — ATORVASTATIN CALCIUM 40 MG/1
40 TABLET, FILM COATED ORAL DAILY
Qty: 90 TABLET | Refills: 3 | Status: SHIPPED | OUTPATIENT
Start: 2025-07-22

## 2025-07-29 DIAGNOSIS — I48.0 PAROXYSMAL ATRIAL FIBRILLATION (MULTI): ICD-10-CM

## 2025-07-29 DIAGNOSIS — I48.92 ATRIAL FLUTTER, PAROXYSMAL (MULTI): ICD-10-CM

## 2025-07-29 RX ORDER — METOPROLOL SUCCINATE 100 MG/1
100 TABLET, EXTENDED RELEASE ORAL DAILY
Qty: 90 TABLET | Refills: 3 | Status: SHIPPED | OUTPATIENT
Start: 2025-07-29 | End: 2026-07-29

## 2025-07-29 RX ORDER — METOPROLOL SUCCINATE 100 MG/1
100 TABLET, EXTENDED RELEASE ORAL DAILY
Qty: 90 TABLET | Refills: 3 | OUTPATIENT
Start: 2025-07-29 | End: 2026-07-29

## 2025-07-29 RX ORDER — DILTIAZEM HYDROCHLORIDE 120 MG/1
120 CAPSULE, COATED, EXTENDED RELEASE ORAL DAILY
Qty: 30 CAPSULE | Refills: 11 | OUTPATIENT
Start: 2025-07-29 | End: 2026-07-29

## 2025-07-29 RX ORDER — DILTIAZEM HYDROCHLORIDE 120 MG/1
120 CAPSULE, COATED, EXTENDED RELEASE ORAL DAILY
Qty: 30 CAPSULE | Refills: 11 | Status: SHIPPED | OUTPATIENT
Start: 2025-07-29 | End: 2026-07-29

## 2025-12-30 ENCOUNTER — APPOINTMENT (OUTPATIENT)
Dept: PRIMARY CARE | Facility: CLINIC | Age: 70
End: 2025-12-30
Payer: MEDICARE

## 2026-03-23 ENCOUNTER — APPOINTMENT (OUTPATIENT)
Dept: CARDIOLOGY | Facility: CLINIC | Age: 71
End: 2026-03-23
Payer: MEDICARE